# Patient Record
Sex: FEMALE | Race: WHITE | NOT HISPANIC OR LATINO
[De-identification: names, ages, dates, MRNs, and addresses within clinical notes are randomized per-mention and may not be internally consistent; named-entity substitution may affect disease eponyms.]

---

## 2017-09-27 ENCOUNTER — APPOINTMENT (OUTPATIENT)
Dept: MRI IMAGING | Facility: CLINIC | Age: 82
End: 2017-09-27
Payer: MEDICARE

## 2017-09-27 ENCOUNTER — OUTPATIENT (OUTPATIENT)
Dept: OUTPATIENT SERVICES | Facility: HOSPITAL | Age: 82
LOS: 1 days | End: 2017-09-27
Payer: MEDICARE

## 2017-09-27 DIAGNOSIS — Z00.8 ENCOUNTER FOR OTHER GENERAL EXAMINATION: ICD-10-CM

## 2017-09-27 PROCEDURE — 72148 MRI LUMBAR SPINE W/O DYE: CPT

## 2017-09-27 PROCEDURE — 72195 MRI PELVIS W/O DYE: CPT | Mod: 26

## 2017-09-27 PROCEDURE — 72195 MRI PELVIS W/O DYE: CPT

## 2017-09-27 PROCEDURE — 72148 MRI LUMBAR SPINE W/O DYE: CPT | Mod: 26

## 2020-02-12 ENCOUNTER — APPOINTMENT (OUTPATIENT)
Dept: OPHTHALMOLOGY | Facility: CLINIC | Age: 85
End: 2020-02-12
Payer: MEDICARE

## 2020-02-12 ENCOUNTER — NON-APPOINTMENT (OUTPATIENT)
Age: 85
End: 2020-02-12

## 2020-02-12 PROCEDURE — 92004 COMPRE OPH EXAM NEW PT 1/>: CPT

## 2020-02-12 PROCEDURE — 92083 EXTENDED VISUAL FIELD XM: CPT

## 2020-02-12 PROCEDURE — 92133 CPTRZD OPH DX IMG PST SGM ON: CPT

## 2020-02-12 PROCEDURE — 92015 DETERMINE REFRACTIVE STATE: CPT

## 2020-10-27 ENCOUNTER — TRANSCRIPTION ENCOUNTER (OUTPATIENT)
Age: 85
End: 2020-10-27

## 2021-02-08 ENCOUNTER — APPOINTMENT (OUTPATIENT)
Dept: MRI IMAGING | Facility: HOSPITAL | Age: 86
End: 2021-02-08

## 2021-02-08 ENCOUNTER — APPOINTMENT (OUTPATIENT)
Dept: ORTHOPEDIC SURGERY | Facility: CLINIC | Age: 86
End: 2021-02-08
Payer: MEDICARE

## 2021-02-08 DIAGNOSIS — M17.11 UNILATERAL PRIMARY OSTEOARTHRITIS, RIGHT KNEE: ICD-10-CM

## 2021-02-08 PROCEDURE — 73564 X-RAY EXAM KNEE 4 OR MORE: CPT | Mod: RT

## 2021-02-08 PROCEDURE — 99203 OFFICE O/P NEW LOW 30 MIN: CPT

## 2021-02-09 ENCOUNTER — APPOINTMENT (OUTPATIENT)
Dept: ORTHOPEDIC SURGERY | Facility: CLINIC | Age: 86
End: 2021-02-09

## 2021-02-09 DIAGNOSIS — M84.451D PATHOLOGICAL FRACTURE, RIGHT FEMUR, SUBSEQUENT ENCOUNTER FOR FRACTURE WITH ROUTINE HEALING: ICD-10-CM

## 2021-02-09 NOTE — HISTORY OF PRESENT ILLNESS
[de-identified] : AHMET JIMENEZ is a 89 year female presenting to the office complaining of right knee pain. She  presents to the office ambulating with a cane and assistance from her daughter . Patient reports pain for the past one week. Patients daughter reports worsening pain that is making it difficulty for her to walk.  Denies injury or trauma to the area.  The patient describes the pain as a dull aching, and occasionally sharp pain localized to the medial aspect of her right knee that is intermittent in nature. Her   symptoms are exacerbated with any weightbearing of the knee.  Pain is alleviated with rest.  Patient denies instability, catching or locking of the knee. \par Patient is taking Tylenol for pain relief with mild relief in symptoms.  Patient denies any other complaints at this time.\par

## 2021-02-09 NOTE — PHYSICAL EXAM
[de-identified] : Right Lower Extremity\par o Hip :\par ¦ Inspection/Palpation : no tenderness, no swelling, no deformities\par ¦ Range of Motion : full and painless in all planes, no crepitus\par ¦ Stability : joint stability intact\par ¦ Tests and Signs : all tests for stability normal\par \par o Knee :\par ¦ Inspection/Palpation : marked medial and lateral joint line tenderness to palpation, no swelling, no deformity\par ¦ Range of Motion : 0 - 90 degrees, no crepitus\par ¦ Stability : no valgus or varus instability present on provocative testing, Lachman’s Test (-)\par ¦ Strength : flexion and extension 3/5 with pain. \par o Muscle Bulk : normal muscle bulk present\par o Skin : no erythema, no ecchymosis\par o Sensation : sensation to pin intact\par o Vascular Exam : no edema, no cyanosis, dorsalis pedis artery pulse 2+, posterior tibial artery pulse 2+\par \par Left Lower Extremity\par o Knee :\par ¦ Inspection/Palpation : no tenderness to palpation, no swelling, no deformity\par ¦ Range of Motion : 0 -110 degrees, no crepitus\par ¦ Stability : no valgus or varus instability present on provocative testing, Lachman’s Test (-)\par ¦ Strength : flexion and extension 5/5\par o Muscle Bulk : normal muscle bulk present\par o Skin : no erythema, no ecchymosis\par o Sensation : sensation to pin intact\par o Vascular Exam : no edema, no cyanosis, dorsalis pedis artery pulse 2+, posterior tibial artery pulse 2+ [de-identified] : o Right Knee : AP, lateral, sunrise, and Jarrett views of the knee were obtained, there are no soft tissue abnormalities, no fractures, alignment is normal, 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

## 2021-02-09 NOTE — DISCUSSION/SUMMARY
[de-identified] : The underlying pathophysiology was reviewed in great detail with the patient as well as the various treatment options, including ice, analgesics, NSAIDs, Physical therapy, steroid injections, hyaluronic gel injections, TKR, \par \par A prescription was provided for a MRI of the right knee to rule out subchondral insufficiency fracture.  \par \par Activity modifications and restrictions were discussed. Discussed WBAT with assistive device. \par \par FU once MRI results are obtained. \par \par All questions were answered, all alternatives discussed and the patient is in complete agreement with that plan. Follow-up appointment as instructed. Any issues and the patient will call or come in sooner.

## 2021-02-10 PROBLEM — M84.451D SUBCHONDRAL INSUFFICIENCY FRACTURE OF CONDYLE OF RIGHT FEMUR WITH ROUTINE HEALING, SUBSEQUENT ENCOUNTER: Status: ACTIVE | Noted: 2021-02-10

## 2021-02-10 RX ORDER — CALCITONIN SALMON 200 [IU]/1
200 SPRAY, METERED NASAL DAILY
Qty: 1 | Refills: 1 | Status: ACTIVE | COMMUNITY
Start: 2021-02-10 | End: 1900-01-01

## 2021-05-17 ENCOUNTER — TRANSCRIPTION ENCOUNTER (OUTPATIENT)
Age: 86
End: 2021-05-17

## 2021-05-17 VITALS
RESPIRATION RATE: 18 BRPM | DIASTOLIC BLOOD PRESSURE: 89 MMHG | HEART RATE: 77 BPM | HEIGHT: 63 IN | SYSTOLIC BLOOD PRESSURE: 190 MMHG | TEMPERATURE: 98 F | WEIGHT: 104.94 LBS | OXYGEN SATURATION: 96 %

## 2021-05-17 LAB
ALBUMIN SERPL ELPH-MCNC: 4.5 G/DL — SIGNIFICANT CHANGE UP (ref 3.3–5)
ALP SERPL-CCNC: 124 U/L — HIGH (ref 40–120)
ALT FLD-CCNC: 17 U/L — SIGNIFICANT CHANGE UP (ref 10–45)
ANION GAP SERPL CALC-SCNC: 10 MMOL/L — SIGNIFICANT CHANGE UP (ref 5–17)
APPEARANCE UR: CLEAR — SIGNIFICANT CHANGE UP
AST SERPL-CCNC: 22 U/L — SIGNIFICANT CHANGE UP (ref 10–40)
BACTERIA # UR AUTO: PRESENT /HPF
BASOPHILS # BLD AUTO: 0.09 K/UL — SIGNIFICANT CHANGE UP (ref 0–0.2)
BASOPHILS NFR BLD AUTO: 0.5 % — SIGNIFICANT CHANGE UP (ref 0–2)
BILIRUB SERPL-MCNC: 0.4 MG/DL — SIGNIFICANT CHANGE UP (ref 0.2–1.2)
BILIRUB UR-MCNC: NEGATIVE — SIGNIFICANT CHANGE UP
BLD GP AB SCN SERPL QL: NEGATIVE — SIGNIFICANT CHANGE UP
BUN SERPL-MCNC: 17 MG/DL — SIGNIFICANT CHANGE UP (ref 7–23)
CALCIUM SERPL-MCNC: 9.9 MG/DL — SIGNIFICANT CHANGE UP (ref 8.4–10.5)
CHLORIDE SERPL-SCNC: 105 MMOL/L — SIGNIFICANT CHANGE UP (ref 96–108)
CO2 SERPL-SCNC: 29 MMOL/L — SIGNIFICANT CHANGE UP (ref 22–31)
COLOR SPEC: YELLOW — SIGNIFICANT CHANGE UP
COMMENT - URINE: SIGNIFICANT CHANGE UP
CREAT SERPL-MCNC: 0.7 MG/DL — SIGNIFICANT CHANGE UP (ref 0.5–1.3)
DIFF PNL FLD: ABNORMAL
EOSINOPHIL # BLD AUTO: 0.23 K/UL — SIGNIFICANT CHANGE UP (ref 0–0.5)
EOSINOPHIL NFR BLD AUTO: 1.3 % — SIGNIFICANT CHANGE UP (ref 0–6)
EPI CELLS # UR: ABNORMAL /HPF (ref 0–5)
GLUCOSE SERPL-MCNC: 112 MG/DL — HIGH (ref 70–99)
GLUCOSE UR QL: NEGATIVE — SIGNIFICANT CHANGE UP
HCT VFR BLD CALC: 45.8 % — HIGH (ref 34.5–45)
HGB BLD-MCNC: 14.7 G/DL — SIGNIFICANT CHANGE UP (ref 11.5–15.5)
IMM GRANULOCYTES NFR BLD AUTO: 0.7 % — SIGNIFICANT CHANGE UP (ref 0–1.5)
KETONES UR-MCNC: NEGATIVE — SIGNIFICANT CHANGE UP
LEUKOCYTE ESTERASE UR-ACNC: ABNORMAL
LYMPHOCYTES # BLD AUTO: 1.97 K/UL — SIGNIFICANT CHANGE UP (ref 1–3.3)
LYMPHOCYTES # BLD AUTO: 11.1 % — LOW (ref 13–44)
MAGNESIUM SERPL-MCNC: 2 MG/DL — SIGNIFICANT CHANGE UP (ref 1.6–2.6)
MCHC RBC-ENTMCNC: 30.2 PG — SIGNIFICANT CHANGE UP (ref 27–34)
MCHC RBC-ENTMCNC: 32.1 GM/DL — SIGNIFICANT CHANGE UP (ref 32–36)
MCV RBC AUTO: 94 FL — SIGNIFICANT CHANGE UP (ref 80–100)
MONOCYTES # BLD AUTO: 0.9 K/UL — SIGNIFICANT CHANGE UP (ref 0–0.9)
MONOCYTES NFR BLD AUTO: 5.1 % — SIGNIFICANT CHANGE UP (ref 2–14)
NEUTROPHILS # BLD AUTO: 14.48 K/UL — HIGH (ref 1.8–7.4)
NEUTROPHILS NFR BLD AUTO: 81.3 % — HIGH (ref 43–77)
NITRITE UR-MCNC: NEGATIVE — SIGNIFICANT CHANGE UP
NRBC # BLD: 0 /100 WBCS — SIGNIFICANT CHANGE UP (ref 0–0)
PH UR: 7 — SIGNIFICANT CHANGE UP (ref 5–8)
PLATELET # BLD AUTO: 383 K/UL — SIGNIFICANT CHANGE UP (ref 150–400)
POTASSIUM SERPL-MCNC: 5 MMOL/L — SIGNIFICANT CHANGE UP (ref 3.5–5.3)
POTASSIUM SERPL-SCNC: 5 MMOL/L — SIGNIFICANT CHANGE UP (ref 3.5–5.3)
PROT SERPL-MCNC: 7.2 G/DL — SIGNIFICANT CHANGE UP (ref 6–8.3)
PROT UR-MCNC: ABNORMAL MG/DL
RBC # BLD: 4.87 M/UL — SIGNIFICANT CHANGE UP (ref 3.8–5.2)
RBC # FLD: 13.4 % — SIGNIFICANT CHANGE UP (ref 10.3–14.5)
RBC CASTS # UR COMP ASSIST: < 5 /HPF — SIGNIFICANT CHANGE UP
RH IG SCN BLD-IMP: POSITIVE — SIGNIFICANT CHANGE UP
SODIUM SERPL-SCNC: 144 MMOL/L — SIGNIFICANT CHANGE UP (ref 135–145)
SP GR SPEC: 1.02 — SIGNIFICANT CHANGE UP (ref 1–1.03)
TROPONIN T SERPL-MCNC: 0.01 NG/ML — SIGNIFICANT CHANGE UP (ref 0–0.01)
UROBILINOGEN FLD QL: 0.2 E.U./DL — SIGNIFICANT CHANGE UP
WBC # BLD: 17.8 K/UL — HIGH (ref 3.8–10.5)
WBC # FLD AUTO: 17.8 K/UL — HIGH (ref 3.8–10.5)
WBC UR QL: > 10 /HPF

## 2021-05-17 PROCEDURE — 72125 CT NECK SPINE W/O DYE: CPT | Mod: 26,MH

## 2021-05-17 PROCEDURE — 99285 EMERGENCY DEPT VISIT HI MDM: CPT | Mod: CS

## 2021-05-17 PROCEDURE — 70450 CT HEAD/BRAIN W/O DYE: CPT | Mod: 26,MH

## 2021-05-17 PROCEDURE — 93010 ELECTROCARDIOGRAM REPORT: CPT

## 2021-05-17 RX ORDER — CEFTRIAXONE 500 MG/1
1000 INJECTION, POWDER, FOR SOLUTION INTRAMUSCULAR; INTRAVENOUS ONCE
Refills: 0 | Status: COMPLETED | OUTPATIENT
Start: 2021-05-17 | End: 2021-05-17

## 2021-05-17 RX ORDER — SODIUM CHLORIDE 9 MG/ML
1000 INJECTION INTRAMUSCULAR; INTRAVENOUS; SUBCUTANEOUS ONCE
Refills: 0 | Status: COMPLETED | OUTPATIENT
Start: 2021-05-17 | End: 2021-05-17

## 2021-05-17 RX ORDER — HALOPERIDOL DECANOATE 100 MG/ML
2.5 INJECTION INTRAMUSCULAR ONCE
Refills: 0 | Status: COMPLETED | OUTPATIENT
Start: 2021-05-17 | End: 2021-05-17

## 2021-05-17 RX ADMIN — SODIUM CHLORIDE 1000 MILLILITER(S): 9 INJECTION INTRAMUSCULAR; INTRAVENOUS; SUBCUTANEOUS at 22:06

## 2021-05-17 RX ADMIN — HALOPERIDOL DECANOATE 2.5 MILLIGRAM(S): 100 INJECTION INTRAMUSCULAR at 22:46

## 2021-05-17 RX ADMIN — CEFTRIAXONE 100 MILLIGRAM(S): 500 INJECTION, POWDER, FOR SOLUTION INTRAMUSCULAR; INTRAVENOUS at 22:06

## 2021-05-17 RX ADMIN — Medication 1 MILLIGRAM(S): at 22:06

## 2021-05-17 NOTE — ED ADULT NURSE REASSESSMENT NOTE - NS ED NURSE REASSESS COMMENT FT1
Patient noted restless in bed, unable to be redirected, unable to follow commands. Xray unable to be completed at this time due to pt's condition. MD Martinez made aware. Haldol given as ordered. Assessment ongoing.

## 2021-05-17 NOTE — ED PROVIDER NOTE - CLINICAL SUMMARY MEDICAL DECISION MAKING FREE TEXT BOX
avss. agitated/confused. per daughter, baseline dementia/confusion/agitation. unable to verbally deescalate. required haldol 5/ativan 1. no leukocytosis vs significant anemia vs electrolyte abnl. trop wnl. ekg w/o significant st/t changes. cxr w/o acute osseous fx vs focal consol vs ptx vs pulm edema vs widened mediastinum. ct head/c spine w/o acute abnl. found to have uti on ua. s/p abx/ivf. ucx pending. also found to have R hip fx on xray. preop labs/ekg done. ortho consulted. will admit per reccs.

## 2021-05-17 NOTE — ED ADULT NURSE NOTE - CHIEF COMPLAINT QUOTE
pt bibems s/p unwitnessed fall at [address] [place] [address] assisted living [facility]. pt a&o to self, unsure of loc and unsure of hitting head. no blood thinner use, per pt paperwork. external rotation noted to right leg.

## 2021-05-17 NOTE — ED ADULT NURSE NOTE - INTERVENTIONS DEFINITIONS
[place] to call system/Call bell, personal items and telephone within reach/Instruct patient to call for assistance/Room bathroom lighting operational/Non-slip footwear when patient is off stretcher/Physically safe environment: no spills, clutter or unnecessary equipment/Stretcher in lowest position, wheels locked, appropriate side rails in place/Provide visual cue, wrist band, yellow gown, etc./Monitor for mental status changes and reorient to person, place, and time/Review medications for side effects contributing to fall risk

## 2021-05-17 NOTE — ED PROVIDER NOTE - PROGRESS NOTE DETAILS
unable to verbally redirect pt. pt pulling at lines and attempting to pull self out of bed despite fall risk. already on standing ativan at [facility], will give ativan 1. requiring additional sedation. will give haldol 2.5. daughter/hcp, yovana london, contacted. updated and agrees w/ plan. ortho reccs for admission to medicine. per medicine hospitalist, pt to be admitted to ortho and medicine will consult. ortho recontacted. reportedly still to go to medicine. will speak w/ ortho attending directly. dr humphrey, ortho, contacted at [phone]. okay to admit to ortho. daughter/hcp, yovana london, contacted. reportedly baseline dementia/confusion/agitation. updated and agrees w/ plan.

## 2021-05-17 NOTE — ED PROVIDER NOTE - CARE PLAN
Principal Discharge DX:	Hip fracture, right  Secondary Diagnosis:	UTI (urinary tract infection)  Secondary Diagnosis:	AMS (altered mental status)   Principal Discharge DX:	Hip fracture, right  Secondary Diagnosis:	UTI (urinary tract infection)

## 2021-05-17 NOTE — ED PROVIDER NOTE - OBJECTIVE STATEMENT
89F progressive dementia, episodic psychosis, depression/anxiety, htn, hld, R knee fx, biba from [address] [place] [address] for R hip pain/unable to move RLE s/p unwitnessed fall pta. ?head/neck trauma. ?loc. no n/v. no antiplatelet/AC. pt unable to provide ros. 89F progressive dementia, episodic psychosis, depression/anxiety, htn, hld, R knee fx, biba from [address] [place] [address] for R hip pain/unable to move RLE s/p unwitnessed fall pta. ?head/neck trauma. ?loc. no n/v. no antiplatelet/AC. pt unable to provide ros.    daughter/hcp, yovana batres: [phone]

## 2021-05-17 NOTE — ED PROVIDER NOTE - PHYSICAL EXAMINATION
CONST: anxious confused uncooperative speaking in full sentences   HEAD: atraumatic  EYES: conjunctivae clear, PERRL, EOMI, no racoon eyes  ENT: mmm, no loose/avulsed dentition, tmj intact, no hernandez sign, no clear rhinorrhea, no hemotympanum  NECK: supple/FROM, no grimace to midline palpation, no jvd  CARD: rrr no murmurs, no chest wall ttp/crepitus/deformity  CHEST: ctab no r/r/w, no stridor/retractions/tripoding  ABD: soft, nd, nttp, no rebound/guarding  PELVIS: +ttp R hip, +externally rotated shorted RLE, +R hip pain w/ axial/log roll  BACK: no stepoffs, atraumatic, no grimace to midline palpation  EXT: compartments soft, FROM, symmetric distal pulses intact  SKIN: warm, dry, no rash, no pedal edema/ttp/rash, cap refill <2sec  NEURO: alert, confused, uncooperative, moves all 4 extremities spontaneously

## 2021-05-17 NOTE — ED ADULT TRIAGE NOTE - CHIEF COMPLAINT QUOTE
pt mike s/p unwitnessed fall at [address] assisted living [facility]. pt a&o to self, unsure of loc, hit head. external rotation noted to right leg. pt bibems s/p unwitnessed fall at [address] [place] [address] assisted living [facility]. pt a&o to self, unsure of loc and unsure of hitting head. no blood thinner use, per pt paperwork. external rotation noted to right leg.

## 2021-05-18 ENCOUNTER — TRANSCRIPTION ENCOUNTER (OUTPATIENT)
Age: 86
End: 2021-05-18

## 2021-05-18 ENCOUNTER — INPATIENT (INPATIENT)
Facility: HOSPITAL | Age: 86
LOS: 2 days | Discharge: NURSING, REHAB OR LONG-TERM CARE | DRG: 480 | End: 2021-05-21
Attending: ORTHOPAEDIC SURGERY | Admitting: HOSPITALIST
Payer: MEDICARE

## 2021-05-18 LAB
APTT BLD: 30.2 SEC — SIGNIFICANT CHANGE UP (ref 27.5–35.5)
BLD GP AB SCN SERPL QL: NEGATIVE — SIGNIFICANT CHANGE UP
GLUCOSE BLDC GLUCOMTR-MCNC: 114 MG/DL — HIGH (ref 70–99)
INR BLD: 1.04 — SIGNIFICANT CHANGE UP (ref 0.88–1.16)
PROTHROM AB SERPL-ACNC: 12.4 SEC — SIGNIFICANT CHANGE UP (ref 10.6–13.6)
RH IG SCN BLD-IMP: POSITIVE — SIGNIFICANT CHANGE UP
SARS-COV-2 RNA SPEC QL NAA+PROBE: SIGNIFICANT CHANGE UP

## 2021-05-18 PROCEDURE — 71045 X-RAY EXAM CHEST 1 VIEW: CPT | Mod: 26

## 2021-05-18 PROCEDURE — 73502 X-RAY EXAM HIP UNI 2-3 VIEWS: CPT | Mod: 26,RT

## 2021-05-18 PROCEDURE — 99222 1ST HOSP IP/OBS MODERATE 55: CPT

## 2021-05-18 PROCEDURE — 73552 X-RAY EXAM OF FEMUR 2/>: CPT | Mod: 26,RT

## 2021-05-18 RX ORDER — SODIUM CHLORIDE 9 MG/ML
1000 INJECTION INTRAMUSCULAR; INTRAVENOUS; SUBCUTANEOUS
Refills: 0 | Status: DISCONTINUED | OUTPATIENT
Start: 2021-05-18 | End: 2021-05-18

## 2021-05-18 RX ORDER — TRAMADOL HYDROCHLORIDE 50 MG/1
50 TABLET ORAL EVERY 4 HOURS
Refills: 0 | Status: DISCONTINUED | OUTPATIENT
Start: 2021-05-18 | End: 2021-05-18

## 2021-05-18 RX ORDER — ACETAMINOPHEN 500 MG
975 TABLET ORAL EVERY 8 HOURS
Refills: 0 | Status: DISCONTINUED | OUTPATIENT
Start: 2021-05-18 | End: 2021-05-18

## 2021-05-18 RX ORDER — CEFAZOLIN SODIUM 1 G
2000 VIAL (EA) INJECTION EVERY 8 HOURS
Refills: 0 | Status: DISCONTINUED | OUTPATIENT
Start: 2021-05-18 | End: 2021-05-18

## 2021-05-18 RX ORDER — HYDROMORPHONE HYDROCHLORIDE 2 MG/ML
0.5 INJECTION INTRAMUSCULAR; INTRAVENOUS; SUBCUTANEOUS
Refills: 0 | Status: DISCONTINUED | OUTPATIENT
Start: 2021-05-18 | End: 2021-05-19

## 2021-05-18 RX ORDER — CEFTRIAXONE 500 MG/1
1000 INJECTION, POWDER, FOR SOLUTION INTRAMUSCULAR; INTRAVENOUS EVERY 24 HOURS
Refills: 0 | Status: DISCONTINUED | OUTPATIENT
Start: 2021-05-18 | End: 2021-05-18

## 2021-05-18 RX ORDER — CEFAZOLIN SODIUM 1 G
2000 VIAL (EA) INJECTION EVERY 8 HOURS
Refills: 0 | Status: COMPLETED | OUTPATIENT
Start: 2021-05-18 | End: 2021-05-19

## 2021-05-18 RX ORDER — SENNA PLUS 8.6 MG/1
2 TABLET ORAL AT BEDTIME
Refills: 0 | Status: DISCONTINUED | OUTPATIENT
Start: 2021-05-18 | End: 2021-05-18

## 2021-05-18 RX ORDER — CHLORHEXIDINE GLUCONATE 213 G/1000ML
1 SOLUTION TOPICAL EVERY 12 HOURS
Refills: 0 | Status: DISCONTINUED | OUTPATIENT
Start: 2021-05-18 | End: 2021-05-18

## 2021-05-18 RX ORDER — SIMVASTATIN 20 MG/1
20 TABLET, FILM COATED ORAL AT BEDTIME
Refills: 0 | Status: DISCONTINUED | OUTPATIENT
Start: 2021-05-18 | End: 2021-05-18

## 2021-05-18 RX ORDER — FOLIC ACID 0.8 MG
1 TABLET ORAL DAILY
Refills: 0 | Status: DISCONTINUED | OUTPATIENT
Start: 2021-05-18 | End: 2021-05-21

## 2021-05-18 RX ORDER — ASPIRIN/CALCIUM CARB/MAGNESIUM 324 MG
325 TABLET ORAL
Refills: 0 | Status: DISCONTINUED | OUTPATIENT
Start: 2021-05-19 | End: 2021-05-21

## 2021-05-18 RX ORDER — ALPRAZOLAM 0.25 MG
0.25 TABLET ORAL
Refills: 0 | Status: DISCONTINUED | OUTPATIENT
Start: 2021-05-18 | End: 2021-05-18

## 2021-05-18 RX ORDER — ONDANSETRON 8 MG/1
4 TABLET, FILM COATED ORAL EVERY 4 HOURS
Refills: 0 | Status: DISCONTINUED | OUTPATIENT
Start: 2021-05-18 | End: 2021-05-21

## 2021-05-18 RX ORDER — HYDRALAZINE HCL 50 MG
5 TABLET ORAL ONCE
Refills: 0 | Status: DISCONTINUED | OUTPATIENT
Start: 2021-05-18 | End: 2021-05-18

## 2021-05-18 RX ORDER — ESCITALOPRAM OXALATE 10 MG/1
20 TABLET, FILM COATED ORAL DAILY
Refills: 0 | Status: DISCONTINUED | OUTPATIENT
Start: 2021-05-18 | End: 2021-05-21

## 2021-05-18 RX ORDER — TRAMADOL HYDROCHLORIDE 50 MG/1
50 TABLET ORAL EVERY 4 HOURS
Refills: 0 | Status: DISCONTINUED | OUTPATIENT
Start: 2021-05-18 | End: 2021-05-21

## 2021-05-18 RX ORDER — PANTOPRAZOLE SODIUM 20 MG/1
40 TABLET, DELAYED RELEASE ORAL
Refills: 0 | Status: DISCONTINUED | OUTPATIENT
Start: 2021-05-18 | End: 2021-05-21

## 2021-05-18 RX ORDER — OXYCODONE HYDROCHLORIDE 5 MG/1
10 TABLET ORAL EVERY 4 HOURS
Refills: 0 | Status: DISCONTINUED | OUTPATIENT
Start: 2021-05-18 | End: 2021-05-18

## 2021-05-18 RX ORDER — POVIDONE-IODINE 5 %
1 AEROSOL (ML) TOPICAL ONCE
Refills: 0 | Status: COMPLETED | OUTPATIENT
Start: 2021-05-18 | End: 2021-05-18

## 2021-05-18 RX ORDER — TRAMADOL HYDROCHLORIDE 50 MG/1
25 TABLET ORAL EVERY 4 HOURS
Refills: 0 | Status: DISCONTINUED | OUTPATIENT
Start: 2021-05-18 | End: 2021-05-21

## 2021-05-18 RX ORDER — SODIUM CHLORIDE 9 MG/ML
1000 INJECTION, SOLUTION INTRAVENOUS
Refills: 0 | Status: DISCONTINUED | OUTPATIENT
Start: 2021-05-18 | End: 2021-05-21

## 2021-05-18 RX ORDER — TRAMADOL HYDROCHLORIDE 50 MG/1
25 TABLET ORAL EVERY 4 HOURS
Refills: 0 | Status: DISCONTINUED | OUTPATIENT
Start: 2021-05-18 | End: 2021-05-18

## 2021-05-18 RX ORDER — ALPRAZOLAM 0.25 MG
0.25 TABLET ORAL
Refills: 0 | Status: DISCONTINUED | OUTPATIENT
Start: 2021-05-18 | End: 2021-05-20

## 2021-05-18 RX ORDER — MAGNESIUM HYDROXIDE 400 MG/1
30 TABLET, CHEWABLE ORAL DAILY
Refills: 0 | Status: DISCONTINUED | OUTPATIENT
Start: 2021-05-18 | End: 2021-05-21

## 2021-05-18 RX ORDER — FAMOTIDINE 10 MG/ML
40 INJECTION INTRAVENOUS EVERY 12 HOURS
Refills: 0 | Status: DISCONTINUED | OUTPATIENT
Start: 2021-05-18 | End: 2021-05-18

## 2021-05-18 RX ORDER — ESCITALOPRAM OXALATE 10 MG/1
20 TABLET, FILM COATED ORAL DAILY
Refills: 0 | Status: DISCONTINUED | OUTPATIENT
Start: 2021-05-18 | End: 2021-05-18

## 2021-05-18 RX ORDER — SENNA PLUS 8.6 MG/1
2 TABLET ORAL AT BEDTIME
Refills: 0 | Status: DISCONTINUED | OUTPATIENT
Start: 2021-05-18 | End: 2021-05-21

## 2021-05-18 RX ORDER — OXYCODONE HYDROCHLORIDE 5 MG/1
5 TABLET ORAL EVERY 4 HOURS
Refills: 0 | Status: DISCONTINUED | OUTPATIENT
Start: 2021-05-18 | End: 2021-05-18

## 2021-05-18 RX ORDER — SIMVASTATIN 20 MG/1
20 TABLET, FILM COATED ORAL AT BEDTIME
Refills: 0 | Status: DISCONTINUED | OUTPATIENT
Start: 2021-05-18 | End: 2021-05-21

## 2021-05-18 RX ORDER — ONDANSETRON 8 MG/1
4 TABLET, FILM COATED ORAL EVERY 6 HOURS
Refills: 0 | Status: DISCONTINUED | OUTPATIENT
Start: 2021-05-18 | End: 2021-05-18

## 2021-05-18 RX ORDER — ACETAMINOPHEN 500 MG
975 TABLET ORAL EVERY 8 HOURS
Refills: 0 | Status: DISCONTINUED | OUTPATIENT
Start: 2021-05-18 | End: 2021-05-21

## 2021-05-18 RX ADMIN — Medication 0.25 MILLIGRAM(S): at 21:11

## 2021-05-18 RX ADMIN — Medication 975 MILLIGRAM(S): at 21:10

## 2021-05-18 RX ADMIN — SODIUM CHLORIDE 60 MILLILITER(S): 9 INJECTION INTRAMUSCULAR; INTRAVENOUS; SUBCUTANEOUS at 09:22

## 2021-05-18 RX ADMIN — SENNA PLUS 2 TABLET(S): 8.6 TABLET ORAL at 21:11

## 2021-05-18 RX ADMIN — SODIUM CHLORIDE 100 MILLILITER(S): 9 INJECTION, SOLUTION INTRAVENOUS at 17:17

## 2021-05-18 RX ADMIN — ESCITALOPRAM OXALATE 20 MILLIGRAM(S): 10 TABLET, FILM COATED ORAL at 17:16

## 2021-05-18 RX ADMIN — Medication 1 APPLICATION(S): at 09:28

## 2021-05-18 RX ADMIN — Medication 975 MILLIGRAM(S): at 22:00

## 2021-05-18 RX ADMIN — SIMVASTATIN 20 MILLIGRAM(S): 20 TABLET, FILM COATED ORAL at 21:10

## 2021-05-18 RX ADMIN — CHLORHEXIDINE GLUCONATE 1 APPLICATION(S): 213 SOLUTION TOPICAL at 06:52

## 2021-05-18 RX ADMIN — Medication 2000 MILLIGRAM(S): at 20:00

## 2021-05-18 NOTE — PACU DISCHARGE NOTE - COMMENTS
pt met criteria at baseline- s/p right femur intertrochanteric nail of the hip- 4 surgical steri strips x4 covered by gauze and Tegaderm with minimal serosanguinous output- severely demented at baseline - frequent orientation provided at risk for fall -pt in need of one-to one- JERRY Hernandez notified -Daughter of pt stated that mother needs additional help due to fall risk- pt agitated - sinus tachycardia to NSR- MD Monzon notified - no intervention at present- denies Pain at present- 2+ bilateral DP via doppler-skin warm to touch - + capillary refill WNL- passed TOV- urinary incontinent- keep pt clean and safe at all times- T&P Q2 hours - generalized ecchymosis with skin tear to left forearm wrapped arrival to PACU- endorsed to 9 ezequiel nurse- Pt left unit via selam on IVF and supplemental oxygen to 951

## 2021-05-18 NOTE — H&P ADULT - HISTORY OF PRESENT ILLNESS
89F hx dementia, HTN and HLD BIBEMS s/p unwitnessed fall with R hip pain. Felt immediate pain and unable to bear weight. No head trauma or LOC. No other injuries. Per daughter, Ayana, patient was dropped off at an assisted living facility today. Ambulates with cane at baseline.

## 2021-05-18 NOTE — PRE-OP CHECKLIST - AS BP NONINV SITE
Occupational Therapy Discharge Summary    Reason for therapy discharge:    All goals and outcomes met, no further needs identified.    Progress towards therapy goal(s). See goals on Care Plan in [facility] electronic health record for goal details.  Goals met    Therapy recommendation(s):    Continue home exercise program.       right upper arm

## 2021-05-18 NOTE — H&P ADULT - NSHPPHYSICALEXAM_GEN_ALL_CORE
Gen: Sleepy but arousable   R hip tender along groin   Sensation intact distally   EHL/FHL/TA/GS firing   2+ DP pulse  Toes WWP

## 2021-05-18 NOTE — BRIEF OPERATIVE NOTE - NSICDXBRIEFPREOP_GEN_ALL_CORE_FT
PRE-OP DIAGNOSIS:  Fracture of femur, intertrochanteric, right, closed 18-May-2021 14:35:11  Alex Hale

## 2021-05-18 NOTE — DIETITIAN INITIAL EVALUATION ADULT. - ADD RECOMMEND
1. Once diet advances, rec DASH/TLC diet 2. Monitor %PO intake and need for ONS 3. pain and BM per team 3. Monitor labs: BMP, lytes, replete PRN

## 2021-05-18 NOTE — CONSULT NOTE ADULT - SUBJECTIVE AND OBJECTIVE BOX
Orthopaedic Surgery Consult Note    For Surgeon:    HPI:  DaciayFemale  Patient is a 89y old  Female who presents with a chief complaint of   HPI:      Allergies    No Known Allergies    Intolerances      PAST MEDICAL & SURGICAL HISTORY:  Dementia    HTN (hypertension)    Anxiety    Depression      MEDICATIONS  (STANDING):    MEDICATIONS  (PRN):      Vital Signs Last 24 Hrs  T(C): 37.3 (17 May 2021 22:08), Max: 37.3 (17 May 2021 22:08)  T(F): 99.2 (17 May 2021 22:08), Max: 99.2 (17 May 2021 22:08)  HR: 77 (17 May 2021 20:44) (77 - 77)  BP: 190/89 (17 May 2021 20:44) (190/89 - 190/89)  BP(mean): --  RR: 18 (17 May 2021 20:44) (18 - 18)  SpO2: 96% (17 May 2021 20:44) (96% - 96%)    Physical Exam:                          14.7   17.80 )-----------( 383      ( 17 May 2021 21:55 )             45.8     05-17    144  |  105  |  17  ----------------------------<  112<H>  5.0   |  29  |  0.70    Ca    9.9      17 May 2021 21:55  Mg     2.0     05-17    TPro  7.2  /  Alb  4.5  /  TBili  0.4  /  DBili  x   /  AST  22  /  ALT  17  /  AlkPhos  124<H>  05-17      Imaging:     A/P: austinFemale    -Discussed with         Orthopaedic Surgery Consult Note    For Surgeon: Ginette    HPI:  89F hx dementia, HTN and HLD BIBEMS s/p unwitnessed fall with R hip pain. Felt immediate pain and unable to bear weight. No head trauma or LOC. No other injuries. Per daughter, Ayana, patient was dropped off at an assisted living facility today. Ambulates with cane at baseline.      Allergies    No Known Allergies    Intolerances      PAST MEDICAL & SURGICAL HISTORY:  Dementia    HTN (hypertension)    Anxiety    Depression      MEDICATIONS  (STANDING):    MEDICATIONS  (PRN):      Vital Signs Last 24 Hrs  T(C): 37.3 (17 May 2021 22:08), Max: 37.3 (17 May 2021 22:08)  T(F): 99.2 (17 May 2021 22:08), Max: 99.2 (17 May 2021 22:08)  HR: 77 (17 May 2021 20:44) (77 - 77)  BP: 190/89 (17 May 2021 20:44) (190/89 - 190/89)  BP(mean): --  RR: 18 (17 May 2021 20:44) (18 - 18)  SpO2: 96% (17 May 2021 20:44) (96% - 96%)    Physical Exam:                          14.7   17.80 )-----------( 383      ( 17 May 2021 21:55 )             45.8     05-17    144  |  105  |  17  ----------------------------<  112<H>  5.0   |  29  |  0.70    Ca    9.9      17 May 2021 21:55  Mg     2.0     05-17    TPro  7.2  /  Alb  4.5  /  TBili  0.4  /  DBili  x   /  AST  22  /  ALT  17  /  AlkPhos  124<H>  05-17      Imaging:   X-ray Hip shows R IT fx    A/P: 89yFemale s/p mechanical fall with R IT fx    - Pain control   - Preop labs  - Xrays full length R femur   - Medical risk stratification and optimization   - NPO/IVF  - Restart home meds  - DVT ppx  - Plan: R hip intramedullary nail placement pending medical clearance       -Discussed with Dr. Peng

## 2021-05-18 NOTE — CONSULT NOTE ADULT - ASSESSMENT
89 year old F w/ PMH of dementia, HTN p/w R IT fracture after mechanical fall.     #Pre-op: RCRI class II risk given q waves on EKG, no active chest pain, no signs of decompensated heart failure, otherwise no hx of CHF, CVA, CKD, IDDM; given urgency of procedure can proceed to surgery, no further workup needed, post-op will need cardiac evaluation (outpatient vs inpatient) for likely underlying CAD  #HTN: likely 2/2 pain from fracture, c/w pain control  #Leukocytosis: likely reactive to fall, trend  #UTI: f/u urine culture, c/w ceftriaxone for 3 day course,   #R hip fracture: care per ortho, c/w pain control, avoid opiates  #Dispo: pending OR

## 2021-05-18 NOTE — DIETITIAN INITIAL EVALUATION ADULT. - PHYSCIAL ASSESSMENT
Pharmacy Medication History  Admission medication history interview status for the 5/24/2020  admission is complete. See EPIC admission navigator for prior to admission medications     Medication history sources: Patient  Medication history source reliability: Good  Adherence assessment: Good    Significant changes made to the medication list:  Removed: Albuterol inhaler, Lexapro, Ativan, Compazine      Additional medication history information:   None    Medication reconciliation completed by provider prior to medication history? No    Time spent in this activity: 15 min      Prior to Admission medications    Medication Sig Last Dose Taking? Auth Provider   acyclovir (ZOVIRAX) 400 MG tablet Take 1 tablet (400 mg) by mouth 2 times daily Viral Prophylaxis. 5/24/2020 at Unknown time Yes Alex Parry MD   aspirin 81 MG chewable tablet Take 81 mg by mouth daily  5/24/2020 at Unknown time Yes Reported, Patient   dexamethasone (DECADRON) 4 MG tablet Take 1 tablet (4 mg) by mouth once a week Once a week with food on Days 1,8,15.  Patient taking differently: Take 4 mg by mouth once a week On Mondays  Once a week with food on Days 1,8,15. 5/18/2020 Yes Alex Parry MD   LENalidomide (REVLIMID) 10 MG CAPS capsule Take 1 capsule (10 mg) by mouth daily for 14 days Take on Days 1 through 14 of 21-day cycle. 5/23/2020 Yes Alex Parry MD   lisinopril (ZESTRIL) 10 MG tablet TAKE 1 TABLET BY MOUTH DAILY 5/24/2020 at Unknown time Yes César Chung MD   Loperamide HCl (IMODIUM A-D PO) Take 1 tablet by mouth as needed   at PRN Yes Reported, Patient   MYRBETRIQ 25 MG 24 hr tablet TAKE ONE TABLET BY MOUTH ONCE DAILY 5/24/2020 at Unknown time Yes César Chung MD   nitroglycerin (NITROSTAT) 0.4 MG sublingual tablet For chest pain place 1 tablet under the tongue every 5 minutes for 3 doses. If symptoms persist 5 minutes after 1st dose call 911.  Yes Jacqueline Webb, MARIUSZ CNP   prochlorperazine (COMPAZINE) 10 MG  tablet Take 1 tablet (10 mg) by mouth every 6 hours as needed (Nausea/Vomiting)  Yes Alex Parry MD   Zoledronic Acid (ZOMETA IV) Inject 4 mg into the vein every 3 months  4/24/2020 Yes Reported, Patient   LENalidomide (REVLIMID) 10 MG CAPS capsule Take 1 capsule (10 mg) by mouth daily for 14 days Take on Days 1 through 14 of 21-day cycle.   Alex Parry MD        Pt with BMI of 19.0, borderline/at risk for underweight well nourished

## 2021-05-18 NOTE — CONSULT NOTE ADULT - SUBJECTIVE AND OBJECTIVE BOX
Patient is a 89y old  Female who presents with a chief complaint of R IT fx (18 May 2021 07:07)      INTERVAL HPI/OVERNIGHT EVENTS: 89 year old F w/ PMH of dementia, HTN, depression/anxiety, vit D deficiency presenting from nursing home after mechanical fall, found to have R IT fracture. History limited by dementia and additional history obtained from daughter by ortho HS. Denies hx of CAD, CHF, CKD, CVA, IDDM. Hx of HTN and off meds.   Patient was seen and examined at bedside. As per nurse and patient, no o/n events, patient resting mostly comfortably but in pain when moved. No complaints at this time. Patient denies: fever, chills, dizziness, weakness, HA, Changes in vision, CP, palpitations, SOB, cough, N/V/D/C, dysuria, changes in bowel movements, LE edema.      PAST MEDICAL & SURGICAL HISTORY:  Dementia    HTN (hypertension)    Anxiety    Depression        SOCIAL HISTORY  Alcohol: unable to obtain  Tobacco: unable to obtain  Illicit substance use: unable to obtain      FAMILY HISTORY: unable to obtain    REVIEW OF SYSTEMS:  CONSTITUTIONAL: No fever, weight loss, or fatigue  EYES: No eye pain, visual disturbances, or discharge  ENMT:  No difficulty hearing, tinnitus, vertigo; No sinus or throat pain  NECK: No pain or stiffness  RESPIRATORY: No cough, wheezing, chills or hemoptysis; No shortness of breath  CARDIOVASCULAR: No chest pain, palpitations, dizziness, or leg swelling  GASTROINTESTINAL: No abdominal or epigastric pain. No nausea, vomiting, or hematemesis; No diarrhea or constipation. No melena or hematochezia.  GENITOURINARY: No dysuria, frequency, hematuria, or incontinence  NEUROLOGICAL: No headaches, memory loss, loss of strength, numbness, or tremors  SKIN: No itching, burning, rashes, or lesions   LYMPH NODES: No enlarged glands  ENDOCRINE: No heat or cold intolerance; No hair loss  MUSCULOSKELETAL: No joint pain or swelling; No muscle, back, or extremity pain  PSYCHIATRIC: No depression, anxiety, mood swings, or difficulty sleeping  HEME/LYMPH: No easy bruising, or bleeding gums  ALLERY AND IMMUNOLOGIC: No hives or eczema    T(C): 36.4 (21 @ 08:46), Max: 37.3 (21 @ 22:08)  HR: 91 (21 @ 08:46) (77 - 91)  BP: 186/87 (21 @ 08:46) (149/99 - 190/89)  RR: 18 (21 @ 08:46) (18 - 19)  SpO2: 94% (21 @ 08:46) (93% - 96%)  Wt(kg): --  I&O's Summary      PHYSICAL EXAM:  GENERAL: frail, elderly,   HEAD:  Atraumatic, Normocephalic  EYES: EOMI, PERRLA,   ENMT: No tonsillar erythema, exudates, or enlargement; MMM  NECK: Supple, No JVD  NERVOUS SYSTEM:  Alert & Oriented X3, no focal deficits   CHEST/LUNG: Clear to percussion bilaterally; No rales, rhonchi, wheezing, or rubs  HEART: Regular rate and rhythm; No murmurs, rubs, or gallops  ABDOMEN: Soft, Nontender, Nondistended;   EXTREMITIES:  R leg externally rotated, skin abrasions on both legs        LABS:                        14.7   17.80 )-----------( 383      ( 17 May 2021 21:55 )             45.8         144  |  105  |  17  ----------------------------<  112<H>  5.0   |  29  |  0.70    Ca    9.9      17 May 2021 21:55  Mg     2.0         TPro  7.2  /  Alb  4.5  /  TBili  0.4  /  DBili  x   /  AST  22  /  ALT  17  /  AlkPhos  124<H>      PT/INR - ( 18 May 2021 00:40 )   PT: 12.4 sec;   INR: 1.04          PTT - ( 18 May 2021 00:40 )  PTT:30.2 sec  Urinalysis Basic - ( 17 May 2021 21:13 )    Color: Yellow / Appearance: Clear / S.025 / pH: x  Gluc: x / Ketone: NEGATIVE  / Bili: Negative / Urobili: 0.2 E.U./dL   Blood: x / Protein: Trace mg/dL / Nitrite: NEGATIVE   Leuk Esterase: Small / RBC: < 5 /HPF / WBC > 10 /HPF   Sq Epi: x / Non Sq Epi: Moderate /HPF / Bacteria: Present /HPF      CAPILLARY BLOOD GLUCOSE      POCT Blood Glucose.: 114 mg/dL (18 May 2021 09:01)        Urinalysis Basic - ( 17 May 2021 21:13 )    Color: Yellow / Appearance: Clear / S.025 / pH: x  Gluc: x / Ketone: NEGATIVE  / Bili: Negative / Urobili: 0.2 E.U./dL   Blood: x / Protein: Trace mg/dL / Nitrite: NEGATIVE   Leuk Esterase: Small / RBC: < 5 /HPF / WBC > 10 /HPF   Sq Epi: x / Non Sq Epi: Moderate /HPF / Bacteria: Present /HPF        MEDICATIONS  (STANDING):  cefTRIAXone   IVPB 1000 milliGRAM(s) IV Intermittent every 24 hours  chlorhexidine 2% Cloths 1 Application(s) Topical every 12 hours  multivitamin 1 Tablet(s) Oral daily  povidone iodine 5% Nasal Swab 1 Application(s) Both Nostrils once  senna 2 Tablet(s) Oral at bedtime  sodium chloride 0.9%. 1000 milliLiter(s) (60 mL/Hr) IV Continuous <Continuous>    MEDICATIONS  (PRN):  acetaminophen   Tablet .. 975 milliGRAM(s) Oral every 8 hours PRN Temp greater or equal to 38C (100.4F), Mild Pain (1 - 3)  ondansetron Injectable 4 milliGRAM(s) IV Push every 6 hours PRN Nausea and/or Vomiting  traMADol 25 milliGRAM(s) Oral every 4 hours PRN Moderate Pain (4 - 6)  traMADol 50 milliGRAM(s) Oral every 4 hours PRN Severe Pain (7 - 10)      RADIOLOGY & ADDITIONAL TESTS:    Imaging Personally Reviewed:  [ ] YES  [ ] NO    Consultant(s) Notes Reviewed:  [ ] YES  [ ] NO    Care Discussed with Consultants/Other Providers [ ] YES  [ ] NO

## 2021-05-18 NOTE — H&P ADULT - ASSESSMENT
89F s/p fall with R IT fx    - Admit  - Medical optimization and risk stratification   - Pain control   - NWB RLE  - DVT ppx  - Restart home meds  - Added on  - Dispo: plan for R IMN today pending medical clearance

## 2021-05-18 NOTE — PROGRESS NOTE ADULT - SUBJECTIVE AND OBJECTIVE BOX
Ortho Post Op Check    Procedure: R gamma nail  Surgeon: Dr. Peng     pt alert however not oriented.   unsure of location or situation   denies pain.   Denies CP, SOB, N/V, numbness/tingling     Vital Signs Last 24 Hrs  T(C): 36.8 (05-18-21 @ 21:45), Max: 36.8 (05-18-21 @ 21:45)  T(F): 98.3 (05-18-21 @ 21:45), Max: 98.3 (05-18-21 @ 21:45)  HR: 96 (05-18-21 @ 21:45) (96 - 106)  BP: 129/79 (05-18-21 @ 21:45) (129/71 - 159/70)  BP(mean): 92 (05-18-21 @ 21:37) (92 - 100)  RR: 19 (05-18-21 @ 21:45) (18 - 26)  SpO2: 94% (05-18-21 @ 21:45) (94% - 95%)      General: Pt Alert and oriented, NAD  DSG w mild serosang output   Pulses: palpable DP  Sensation: SILT throughout   Motor: 5/5 EHL/FHL/TA/GS        Post:   hardware in place    A/P: 89yFemale POD#0 s/p above procedure  - Stable  - Pain Control   - DVT ppx: asa  - Post op abx: ancef  - PT, WBS: WBAT    Ortho Pager [phone]

## 2021-05-18 NOTE — BRIEF OPERATIVE NOTE - NSICDXBRIEFPOSTOP_GEN_ALL_CORE_FT
POST-OP DIAGNOSIS:  Fracture of femur, intertrochanteric, right, closed 18-May-2021 14:35:16  Alex Hale

## 2021-05-18 NOTE — DIETITIAN INITIAL EVALUATION ADULT. - OTHER INFO
89F hx dementia, HTN and HLD, BIBEMS s/p unwitnessed fall with R hip pain. Plan for R IMN.    Pt currently in OR. Spoke to daughter, reported pt to have no issues with eating, obtained diet hx PTA. Reports a variety of foods that pt normally eats, where pt snacks throughout the day. Reports no chewing or swallowing difficulties, NKFA. UBW to be 90 lbs, no recent wt changes. Unable to perform NFPE, deferred at this time. GI: fecal incontinence. Skin integrity: Galileo 11, no edema noted, pressure wise skin intact. Will continue to follow per RD protocol.

## 2021-05-18 NOTE — DIETITIAN INITIAL EVALUATION ADULT. - OTHER CALCULATIONS
ABW used to calculate energy needs due to pt's current body weight within % IBW (91%). Estimated based on needs for older adults, and hypermetabolic state 2/2 s/p surgery

## 2021-05-19 LAB
-  AMPICILLIN/SULBACTAM: SIGNIFICANT CHANGE UP
-  AMPICILLIN: SIGNIFICANT CHANGE UP
-  CEFAZOLIN: SIGNIFICANT CHANGE UP
-  CEFTRIAXONE: SIGNIFICANT CHANGE UP
-  ERTAPENEM: SIGNIFICANT CHANGE UP
-  GENTAMICIN: SIGNIFICANT CHANGE UP
-  NITROFURANTOIN: SIGNIFICANT CHANGE UP
-  PIPERACILLIN/TAZOBACTAM: SIGNIFICANT CHANGE UP
-  TOBRAMYCIN: SIGNIFICANT CHANGE UP
-  TRIMETHOPRIM/SULFAMETHOXAZOLE: SIGNIFICANT CHANGE UP
ANION GAP SERPL CALC-SCNC: 12 MMOL/L — SIGNIFICANT CHANGE UP (ref 5–17)
BUN SERPL-MCNC: 15 MG/DL — SIGNIFICANT CHANGE UP (ref 7–23)
CALCIUM SERPL-MCNC: 9.2 MG/DL — SIGNIFICANT CHANGE UP (ref 8.4–10.5)
CHLORIDE SERPL-SCNC: 103 MMOL/L — SIGNIFICANT CHANGE UP (ref 96–108)
CO2 SERPL-SCNC: 25 MMOL/L — SIGNIFICANT CHANGE UP (ref 22–31)
CREAT SERPL-MCNC: 0.58 MG/DL — SIGNIFICANT CHANGE UP (ref 0.5–1.3)
GLUCOSE SERPL-MCNC: 115 MG/DL — HIGH (ref 70–99)
HCT VFR BLD CALC: 32.1 % — LOW (ref 34.5–45)
HGB BLD-MCNC: 10.5 G/DL — LOW (ref 11.5–15.5)
MCHC RBC-ENTMCNC: 29.7 PG — SIGNIFICANT CHANGE UP (ref 27–34)
MCHC RBC-ENTMCNC: 32.7 GM/DL — SIGNIFICANT CHANGE UP (ref 32–36)
MCV RBC AUTO: 90.7 FL — SIGNIFICANT CHANGE UP (ref 80–100)
METHOD TYPE: SIGNIFICANT CHANGE UP
NRBC # BLD: 0 /100 WBCS — SIGNIFICANT CHANGE UP (ref 0–0)
PLATELET # BLD AUTO: 346 K/UL — SIGNIFICANT CHANGE UP (ref 150–400)
POTASSIUM SERPL-MCNC: 3.6 MMOL/L — SIGNIFICANT CHANGE UP (ref 3.5–5.3)
POTASSIUM SERPL-SCNC: 3.6 MMOL/L — SIGNIFICANT CHANGE UP (ref 3.5–5.3)
RBC # BLD: 3.54 M/UL — LOW (ref 3.8–5.2)
RBC # FLD: 13.4 % — SIGNIFICANT CHANGE UP (ref 10.3–14.5)
SODIUM SERPL-SCNC: 140 MMOL/L — SIGNIFICANT CHANGE UP (ref 135–145)
WBC # BLD: 19.12 K/UL — HIGH (ref 3.8–10.5)
WBC # FLD AUTO: 19.12 K/UL — HIGH (ref 3.8–10.5)

## 2021-05-19 PROCEDURE — 99232 SBSQ HOSP IP/OBS MODERATE 35: CPT

## 2021-05-19 RX ORDER — CEFPODOXIME PROXETIL 100 MG
100 TABLET ORAL EVERY 12 HOURS
Refills: 0 | Status: DISCONTINUED | OUTPATIENT
Start: 2021-05-19 | End: 2021-05-19

## 2021-05-19 RX ORDER — CEFTRIAXONE 500 MG/1
1000 INJECTION, POWDER, FOR SOLUTION INTRAMUSCULAR; INTRAVENOUS ONCE
Refills: 0 | Status: COMPLETED | OUTPATIENT
Start: 2021-05-19 | End: 2021-05-19

## 2021-05-19 RX ORDER — MULTIVIT-MIN/FERROUS GLUCONATE 9 MG/15 ML
1 LIQUID (ML) ORAL DAILY
Refills: 0 | Status: DISCONTINUED | OUTPATIENT
Start: 2021-05-19 | End: 2021-05-21

## 2021-05-19 RX ORDER — CEFTRIAXONE 500 MG/1
INJECTION, POWDER, FOR SOLUTION INTRAMUSCULAR; INTRAVENOUS
Refills: 0 | Status: DISCONTINUED | OUTPATIENT
Start: 2021-05-19 | End: 2021-05-20

## 2021-05-19 RX ORDER — CEFTRIAXONE 500 MG/1
1000 INJECTION, POWDER, FOR SOLUTION INTRAMUSCULAR; INTRAVENOUS EVERY 24 HOURS
Refills: 0 | Status: DISCONTINUED | OUTPATIENT
Start: 2021-05-20 | End: 2021-05-20

## 2021-05-19 RX ADMIN — Medication 975 MILLIGRAM(S): at 15:20

## 2021-05-19 RX ADMIN — Medication 0.25 MILLIGRAM(S): at 20:47

## 2021-05-19 RX ADMIN — Medication 0.25 MILLIGRAM(S): at 06:01

## 2021-05-19 RX ADMIN — Medication 325 MILLIGRAM(S): at 06:01

## 2021-05-19 RX ADMIN — Medication 975 MILLIGRAM(S): at 06:00

## 2021-05-19 RX ADMIN — Medication 975 MILLIGRAM(S): at 22:10

## 2021-05-19 RX ADMIN — SENNA PLUS 2 TABLET(S): 8.6 TABLET ORAL at 20:47

## 2021-05-19 RX ADMIN — Medication 975 MILLIGRAM(S): at 14:22

## 2021-05-19 RX ADMIN — Medication 975 MILLIGRAM(S): at 23:05

## 2021-05-19 RX ADMIN — ESCITALOPRAM OXALATE 20 MILLIGRAM(S): 10 TABLET, FILM COATED ORAL at 12:19

## 2021-05-19 RX ADMIN — PANTOPRAZOLE SODIUM 40 MILLIGRAM(S): 20 TABLET, DELAYED RELEASE ORAL at 06:01

## 2021-05-19 RX ADMIN — TRAMADOL HYDROCHLORIDE 50 MILLIGRAM(S): 50 TABLET ORAL at 18:55

## 2021-05-19 RX ADMIN — SIMVASTATIN 20 MILLIGRAM(S): 20 TABLET, FILM COATED ORAL at 20:47

## 2021-05-19 RX ADMIN — TRAMADOL HYDROCHLORIDE 50 MILLIGRAM(S): 50 TABLET ORAL at 17:56

## 2021-05-19 RX ADMIN — Medication 2000 MILLIGRAM(S): at 04:21

## 2021-05-19 RX ADMIN — Medication 1 TABLET(S): at 12:18

## 2021-05-19 RX ADMIN — TRAMADOL HYDROCHLORIDE 50 MILLIGRAM(S): 50 TABLET ORAL at 23:05

## 2021-05-19 RX ADMIN — CEFTRIAXONE 100 MILLIGRAM(S): 500 INJECTION, POWDER, FOR SOLUTION INTRAMUSCULAR; INTRAVENOUS at 16:16

## 2021-05-19 RX ADMIN — TRAMADOL HYDROCHLORIDE 50 MILLIGRAM(S): 50 TABLET ORAL at 22:11

## 2021-05-19 RX ADMIN — Medication 325 MILLIGRAM(S): at 17:56

## 2021-05-19 RX ADMIN — Medication 975 MILLIGRAM(S): at 07:00

## 2021-05-19 RX ADMIN — Medication 1 MILLIGRAM(S): at 12:19

## 2021-05-19 NOTE — PHYSICAL THERAPY INITIAL EVALUATION ADULT - MANUAL MUSCLE TESTING RESULTS, REHAB EVAL
functional mobility testing; >/=3+/5 bilateral UE and LLE (RLE NT due to pain)/grossly assessed due to

## 2021-05-19 NOTE — PROGRESS NOTE ADULT - SUBJECTIVE AND OBJECTIVE BOX
Ortho Progress Note    Subjective:  Pt comfortable without complaints, pain controlled with medication. Appears comfortable.  Denies CP, SOB, N/V, numbness/tingling.    Objective:    Vital Signs Last 24 Hrs  T(C): 36.3 (05-19-21 @ 06:18), Max: 36.3 (05-19-21 @ 06:18)  T(F): 97.3 (05-19-21 @ 06:18), Max: 97.3 (05-19-21 @ 06:18)  HR: 95 (05-19-21 @ 06:18) (95 - 95)  BP: 176/76 (05-19-21 @ 06:18) (176/76 - 176/76)  BP(mean): --  RR: 18 (05-19-21 @ 06:18) (18 - 18)  SpO2: 98% (05-19-21 @ 06:18) (98% - 98%)  AVSS    Physical Exam:  General: Pt Alert and oriented, NAD  RLE:  Dressing C/D/I  Pulses: DP/PT palpable  Sensation: SILT throughout  Motor: 5/5 EHL/FHL/TA/GS    Labs:                        14.7   17.80 )-----------( 383      ( 17 May 2021 21:55 )             45.8     05-17    144  |  105  |  17  ----------------------------<  112<H>  5.0   |  29  |  0.70    Ca    9.9      17 May 2021 21:55  Mg     2.0     05-17    TPro  7.2  /  Alb  4.5  /  TBili  0.4  /  DBili  x   /  AST  22  /  ALT  17  /  AlkPhos  124<H>  05-17  \par     A/P: 89yFemale s/p R IM Nail by Dr. Peng on 5/18.  - Stable  - Pain Control  - DVT ppx: SCDs, ASA 325mg bid  - PT, WBS: WBAT  - DIspo: Pending PT eval    Ortho Pager [phone]

## 2021-05-19 NOTE — PHYSICAL THERAPY INITIAL EVALUATION ADULT - SITTING BALANCE: DYNAMIC
Principal Discharge DX:	CAD (coronary artery disease)  Goal:	Pt remains leg pain free and understands post cath discharge instructions  Instructions for follow-up, activity and diet:	No heavy lifting, strenuous activity, bending, straining or unnecessary stair climbing  for 2 weeks. No sex for 1 week.  No driving for 2 days. You may shower 24 hours following procedure but avoid baths and swimming for 1 week. Check groin site for bleeding and/or swelling daily following procedure. Call your doctor/cardiologist immediately should it occur or if you have increased/persistent pain at the site. Follow up with your cardiologist in 1- 2 weeks. You may call [facility] at [phone] or [phone] after office hours and weekends  with any questions or concerns following your procedure. Take medications as prescribed. DO NOT STOP YOUR ASPIRIN AND PLAVIX BEFORE SPEAKING WITH YOUR CARDIOLOGIST  Secondary Diagnosis:	HTN (hypertension)  Goal:	Your blood pressure will be controlled.  Instructions for follow-up, activity and diet:	Continue with your blood pressure medications; eat a heart healthy diet with low salt diet; exercise regularly (consult with your physician or cardiologist first); maintain a heart healthy weight; if you smoke - quit (A resource to help you stop smoking is the [facility] [employer] – phone number [phone].); include healthy ways to manage stress. Continue to follow with your primary care physician or cardiologist.  Secondary Diagnosis:	HLD (hyperlipidemia)  Goal:	Your LDL cholesterol will be less than 70mg/dL  Instructions for follow-up, activity and diet:	Continue with your cholesterol medications. Eat a heart healthy diet that is low in saturated fats and salt, and includes whole grains, fruits, vegetables and lean protein; exercise regularly (consult with your physician or cardiologist first); maintain a heart healthy weight. Continue to follow with your primary physician or cardiologist for treatment goals, continue medication, have liver function testing every 3 months as anti lipid medications can cause liver irritation. If you smoke - quit (A resource to help you stop smoking is the [facility] [employer] – phone number [phone].). poor plus Principal Discharge DX:	CAD (coronary artery disease)  Goal:	Pt remains leg pain free and understands post cath discharge instructions  Instructions for follow-up, activity and diet:	No heavy lifting, strenuous activity, bending, straining or unnecessary stair climbing  for 2 weeks. No sex for 1 week.  No driving for 2 days. You may shower 24 hours following procedure but avoid baths and swimming for 1 week. Check groin site for bleeding and/or swelling daily following procedure. Call your doctor/cardiologist immediately should it occur or if you have increased/persistent pain at the site. Follow up with your cardiologist in 1- 2 weeks. You may call [facility] at [phone] or [phone] after office hours and weekends  with any questions or concerns following your procedure. Take medications as prescribed. DO NOT STOP YOUR ASPIRIN AND PLAVIX BEFORE SPEAKING WITH YOUR CARDIOLOGIST  Secondary Diagnosis:	HTN (hypertension)  Goal:	Your blood pressure will be controlled.  Instructions for follow-up, activity and diet:	Continue with your blood pressure medications; eat a heart healthy diet with low salt diet; exercise regularly (consult with your physician or cardiologist first); maintain a heart healthy weight; if you smoke - quit (A resource to help you stop smoking is the [facility] [employer] – phone number [phone].); include healthy ways to manage stress. Continue to follow with your primary care physician or cardiologist.  Secondary Diagnosis:	HLD (hyperlipidemia)  Goal:	Your LDL cholesterol will be less than 70mg/dL  Instructions for follow-up, activity and diet:	Continue with your cholesterol medications. Eat a heart healthy diet that is low in saturated fats and salt, and includes whole grains, fruits, vegetables and lean protein; exercise regularly (consult with your physician or cardiologist first); maintain a heart healthy weight. Continue to follow with your primary physician or cardiologist for treatment goals, continue medication, have liver function testing every 3 months as anti lipid medications can cause liver irritation. If you smoke - quit (A resource to help you stop smoking is the [facility] [employer] – phone number [phone].). Principal Discharge DX:	CAD (coronary artery disease)  Goal:	Pt remains leg pain free and understands post cath discharge instructions  Instructions for follow-up, activity and diet:	No heavy lifting, strenuous activity, bending, straining or unnecessary stair climbing  for 2 weeks. No sex for 1 week.  No driving for 2 days. You may shower 24 hours following procedure but avoid baths and swimming for 1 week. Check groin site for bleeding and/or swelling daily following procedure. Call your doctor/cardiologist immediately should it occur or if you have increased/persistent pain at the site. Follow up with your cardiologist in 1- 2 weeks. You may call [facility] at [phone] or [phone] after office hours and weekends  with any questions or concerns following your procedure. Take medications as prescribed. DO NOT STOP YOUR ASPIRIN AND PLAVIX BEFORE SPEAKING WITH YOUR CARDIOLOGIST  Secondary Diagnosis:	HTN (hypertension)  Goal:	Your blood pressure will be controlled.  Instructions for follow-up, activity and diet:	Continue with your blood pressure medications; eat a heart healthy diet with low salt diet; exercise regularly (consult with your physician or cardiologist first); maintain a heart healthy weight; if you smoke - quit (A resource to help you stop smoking is the [facility] [employer] – phone number [phone].); include healthy ways to manage stress. Continue to follow with your primary care physician or cardiologist.  Secondary Diagnosis:	HLD (hyperlipidemia)  Goal:	Your LDL cholesterol will be less than 70mg/dL  Instructions for follow-up, activity and diet:	Continue with your cholesterol medications. Eat a heart healthy diet that is low in saturated fats and salt, and includes whole grains, fruits, vegetables and lean protein; exercise regularly (consult with your physician or cardiologist first); maintain a heart healthy weight. Continue to follow with your primary physician or cardiologist for treatment goals, continue medication, have liver function testing every 3 months as anti lipid medications can cause liver irritation. If you smoke - quit (A resource to help you stop smoking is the [facility] [employer] – phone number [phone].). Principal Discharge DX:	PAD (peripheral artery disease)  Goal:	Pt remains leg pain free and understands post cath discharge instructions  Instructions for follow-up, activity and diet:	No heavy lifting, strenuous activity, bending, straining or unnecessary stair climbing  for 2 weeks. No sex for 1 week.  No driving for 2 days. You may shower 24 hours following procedure but avoid baths and swimming for 1 week. Check groin site for bleeding and/or swelling daily following procedure. Call your doctor/cardiologist immediately should it occur or if you have increased/persistent pain at the site. Follow up with your cardiologist in 1- 2 weeks. You may call [facility] at [phone] or [phone] after office hours and weekends  with any questions or concerns following your procedure. Take medications as prescribed. DO NOT STOP YOUR ASPIRIN AND PLAVIX BEFORE SPEAKING WITH YOUR CARDIOLOGIST  Secondary Diagnosis:	HTN (hypertension)  Goal:	Your blood pressure will be controlled.  Instructions for follow-up, activity and diet:	Continue with your blood pressure medications; eat a heart healthy diet with low salt diet; exercise regularly (consult with your physician or cardiologist first); maintain a heart healthy weight; if you smoke - quit (A resource to help you stop smoking is the [facility] [employer] – phone number [phone].); include healthy ways to manage stress. Continue to follow with your primary care physician or cardiologist.  Secondary Diagnosis:	HLD (hyperlipidemia)  Goal:	Your LDL cholesterol will be less than 70mg/dL  Instructions for follow-up, activity and diet:	Continue with your cholesterol medications. Eat a heart healthy diet that is low in saturated fats and salt, and includes whole grains, fruits, vegetables and lean protein; exercise regularly (consult with your physician or cardiologist first); maintain a heart healthy weight. Continue to follow with your primary physician or cardiologist for treatment goals, continue medication, have liver function testing every 3 months as anti lipid medications can cause liver irritation. If you smoke - quit (A resource to help you stop smoking is the [facility] [employer] – phone number [phone].).  Secondary Diagnosis:	Coronary artery disease involving native coronary artery of native heart without angina pectoris  Goal:	Pt is free from chest pain  Instructions for follow-up, activity and diet:	Pt remains chest pain free and understands post cath discharge instructions  Secondary Diagnosis:	Smoking trying to quit  Goal:	You will continue not to smoke.  Instructions for follow-up, activity and diet:	If you are on medication to help you quit smoking, be sure to take it as prescribed. Find healthy ways to deal with stress, such as exercise (check with your healthcare provider first), deep breathing, meditation, or enjoyable healthy hobbies.  Avoid situations that may cause you to smoke a cigarette.  Look for help with quitting; you are not alone. A resource to help you stop smoking is the [facility] – phone number [phone]. Principal Discharge DX:	PAD (peripheral artery disease)  Goal:	Pt remains leg pain free and understands post cath discharge instructions  Instructions for follow-up, activity and diet:	No heavy lifting, strenuous activity, bending, straining or unnecessary stair climbing  for 2 weeks. No sex for 1 week.  No driving for 2 days. You may shower 24 hours following procedure but avoid baths and swimming for 1 week. Check groin site for bleeding and/or swelling daily following procedure. Call your doctor/cardiologist immediately should it occur or if you have increased/persistent pain at the site. Follow up with your cardiologist in 1- 2 weeks. You may call [facility] at [phone] or [phone] after office hours and weekends  with any questions or concerns following your procedure. Take medications as prescribed. DO NOT STOP YOUR ASPIRIN AND PLAVIX BEFORE SPEAKING WITH YOUR CARDIOLOGIST  Secondary Diagnosis:	HTN (hypertension)  Goal:	Your blood pressure will be controlled.  Instructions for follow-up, activity and diet:	Continue with your blood pressure medications; eat a heart healthy diet with low salt diet; exercise regularly (consult with your physician or cardiologist first); maintain a heart healthy weight; if you smoke - quit (A resource to help you stop smoking is the [facility] [employer] – phone number [phone].); include healthy ways to manage stress. Continue to follow with your primary care physician or cardiologist.  Secondary Diagnosis:	HLD (hyperlipidemia)  Goal:	Your LDL cholesterol will be less than 70mg/dL  Instructions for follow-up, activity and diet:	Continue with your cholesterol medications. Eat a heart healthy diet that is low in saturated fats and salt, and includes whole grains, fruits, vegetables and lean protein; exercise regularly (consult with your physician or cardiologist first); maintain a heart healthy weight. Continue to follow with your primary physician or cardiologist for treatment goals, continue medication, have liver function testing every 3 months as anti lipid medications can cause liver irritation. If you smoke - quit (A resource to help you stop smoking is the [facility] [employer] – phone number [phone].).  Secondary Diagnosis:	Coronary artery disease involving native coronary artery of native heart without angina pectoris  Goal:	Pt is free from chest pain  Instructions for follow-up, activity and diet:	Pt remains chest pain free and understands post cath discharge instructions  Secondary Diagnosis:	Smoking trying to quit  Goal:	You will continue not to smoke.  Instructions for follow-up, activity and diet:	If you are on medication to help you quit smoking, be sure to take it as prescribed. Find healthy ways to deal with stress, such as exercise (check with your healthcare provider first), deep breathing, meditation, or enjoyable healthy hobbies.  Avoid situations that may cause you to smoke a cigarette.  Look for help with quitting; you are not alone. A resource to help you stop smoking is the [facility] – phone number [phone]. Principal Discharge DX:	PAD (peripheral artery disease)  Goal:	Pt remains leg pain free and understands post cath discharge instructions  Instructions for follow-up, activity and diet:	No heavy lifting, strenuous activity, bending, straining or unnecessary stair climbing  for 2 weeks. No sex for 1 week.  No driving for 2 days. You may shower 24 hours following procedure but avoid baths and swimming for 1 week. Check groin site for bleeding and/or swelling daily following procedure. Call your doctor/cardiologist immediately should it occur or if you have increased/persistent pain at the site. Follow up with your cardiologist in 1- 2 weeks. You may call [facility] at [phone] or [phone] after office hours and weekends  with any questions or concerns following your procedure. Take medications as prescribed. DO NOT STOP YOUR ASPIRIN AND PLAVIX BEFORE SPEAKING WITH YOUR CARDIOLOGIST  Secondary Diagnosis:	HTN (hypertension)  Goal:	Your blood pressure will be controlled.  Instructions for follow-up, activity and diet:	Continue with your blood pressure medications; eat a heart healthy diet with low salt diet; exercise regularly (consult with your physician or cardiologist first); maintain a heart healthy weight; if you smoke - quit (A resource to help you stop smoking is the [facility] – phone number [phone].); include healthy ways to manage stress. Continue to follow with your primary care physician or cardiologist.  Secondary Diagnosis:	HLD (hyperlipidemia)  Goal:	Your LDL cholesterol will be less than 70mg/dL  Instructions for follow-up, activity and diet:	Continue with your cholesterol medications. Eat a heart healthy diet that is low in saturated fats and salt, and includes whole grains, fruits, vegetables and lean protein; exercise regularly (consult with your physician or cardiologist first); maintain a heart healthy weight. Continue to follow with your primary physician or cardiologist for treatment goals, continue medication, have liver function testing every 3 months as anti lipid medications can cause liver irritation. If you smoke - quit (A resource to help you stop smoking is the [facility] – phone number [phone].).  Secondary Diagnosis:	Coronary artery disease involving native coronary artery of native heart without angina pectoris  Goal:	Pt is free from chest pain  Instructions for follow-up, activity and diet:	Pt remains chest pain free and understands post cath discharge instructions  Secondary Diagnosis:	Smoking trying to quit  Goal:	You will continue not to smoke.  Instructions for follow-up, activity and diet:	If you are on medication to help you quit smoking, be sure to take it as prescribed. Find healthy ways to deal with stress, such as exercise (check with your healthcare provider first), deep breathing, meditation, or enjoyable healthy hobbies.  Avoid situations that may cause you to smoke a cigarette.  Look for help with quitting; you are not alone. A resource to help you stop smoking is the [facility] – phone number [phone]. Principal Discharge DX:	PAD (peripheral artery disease)  Goal:	Pt remains leg pain free and understands post cath discharge instructions  Instructions for follow-up, activity and diet:	No heavy lifting, strenuous activity, bending, straining or unnecessary stair climbing  for 2 weeks. No sex for 1 week.  No driving for 2 days. You may shower 24 hours following procedure but avoid baths and swimming for 1 week. Check groin site for bleeding and/or swelling daily following procedure. Call your doctor/cardiologist immediately should it occur or if you have increased/persistent pain at the site. Follow up with your cardiologist in 1- 2 weeks. You may call [facility] at [phone] or [phone] after office hours and weekends  with any questions or concerns following your procedure. Take medications as prescribed. DO NOT STOP YOUR ASPIRIN AND PLAVIX BEFORE SPEAKING WITH YOUR CARDIOLOGIST  Secondary Diagnosis:	HTN (hypertension)  Goal:	Your blood pressure will be controlled.  Instructions for follow-up, activity and diet:	Continue with your blood pressure medications; eat a heart healthy diet with low salt diet; exercise regularly (consult with your physician or cardiologist first); maintain a heart healthy weight; if you smoke - quit (A resource to help you stop smoking is the [facility] [employer] – phone number [phone].); include healthy ways to manage stress. Continue to follow with your primary care physician or cardiologist.  Secondary Diagnosis:	HLD (hyperlipidemia)  Goal:	Your LDL cholesterol will be less than 70mg/dL  Instructions for follow-up, activity and diet:	Continue with your cholesterol medications. Eat a heart healthy diet that is low in saturated fats and salt, and includes whole grains, fruits, vegetables and lean protein; exercise regularly (consult with your physician or cardiologist first); maintain a heart healthy weight. Continue to follow with your primary physician or cardiologist for treatment goals, continue medication, have liver function testing every 3 months as anti lipid medications can cause liver irritation. If you smoke - quit (A resource to help you stop smoking is the [facility] [employer] – phone number [phone].).  Secondary Diagnosis:	Coronary artery disease involving native coronary artery of native heart without angina pectoris  Goal:	Pt is free from chest pain  Instructions for follow-up, activity and diet:	Pt remains chest pain free and understands post cath discharge instructions  Secondary Diagnosis:	Smoking trying to quit  Goal:	You will continue not to smoke.  Instructions for follow-up, activity and diet:	If you are on medication to help you quit smoking, be sure to take it as prescribed. Find healthy ways to deal with stress, such as exercise (check with your healthcare provider first), deep breathing, meditation, or enjoyable healthy hobbies.  Avoid situations that may cause you to smoke a cigarette.  Look for help with quitting; you are not alone. A resource to help you stop smoking is the [facility] – phone number [phone]. Principal Discharge DX:	PAD (peripheral artery disease)  Goal:	Pt remains leg pain free and understands post cath discharge instructions  Instructions for follow-up, activity and diet:	No heavy lifting, strenuous activity, bending, straining or unnecessary stair climbing  for 2 weeks. No sex for 1 week.  No driving for 2 days. You may shower 24 hours following procedure but avoid baths and swimming for 1 week. Check groin site for bleeding and/or swelling daily following procedure. Call your doctor/cardiologist immediately should it occur or if you have increased/persistent pain at the site. Follow up with your cardiologist in 1- 2 weeks. You may call [facility] at [phone] or [phone] after office hours and weekends  with any questions or concerns following your procedure. Take medications as prescribed. DO NOT STOP YOUR ASPIRIN AND PLAVIX BEFORE SPEAKING WITH YOUR CARDIOLOGIST  Secondary Diagnosis:	HTN (hypertension)  Goal:	Your blood pressure will be controlled.  Instructions for follow-up, activity and diet:	Continue with your blood pressure medications; eat a heart healthy diet with low salt diet; exercise regularly (consult with your physician or cardiologist first); maintain a heart healthy weight; if you smoke - quit (A resource to help you stop smoking is the [facility] [employer] – phone number [phone].); include healthy ways to manage stress. Continue to follow with your primary care physician or cardiologist.  Secondary Diagnosis:	HLD (hyperlipidemia)  Goal:	Your LDL cholesterol will be less than 70mg/dL  Instructions for follow-up, activity and diet:	Continue with your cholesterol medications. Eat a heart healthy diet that is low in saturated fats and salt, and includes whole grains, fruits, vegetables and lean protein; exercise regularly (consult with your physician or cardiologist first); maintain a heart healthy weight. Continue to follow with your primary physician or cardiologist for treatment goals, continue medication, have liver function testing every 3 months as anti lipid medications can cause liver irritation. If you smoke - quit (A resource to help you stop smoking is the [facility] [employer] – phone number [phone].).  Secondary Diagnosis:	Coronary artery disease involving native coronary artery of native heart without angina pectoris  Goal:	Pt is free from chest pain  Instructions for follow-up, activity and diet:	Pt remains chest pain free and understands post cath discharge instructions  Secondary Diagnosis:	Smoking trying to quit  Goal:	You will continue not to smoke.  Instructions for follow-up, activity and diet:	If you are on medication to help you quit smoking, be sure to take it as prescribed. Find healthy ways to deal with stress, such as exercise (check with your healthcare provider first), deep breathing, meditation, or enjoyable healthy hobbies.  Avoid situations that may cause you to smoke a cigarette.  Look for help with quitting; you are not alone. A resource to help you stop smoking is the [facility] – phone number [phone].

## 2021-05-19 NOTE — PHYSICAL THERAPY INITIAL EVALUATION ADULT - ADDITIONAL COMMENTS
Per pt's daughter Ayana at bedside, prior to hospitalization, pt was ambulating independently with no DME in the home, and with [place] in the community. Up until day of fall, pt had been living in apt with daughter Ayana assisting as needed. On the day of the fall, pt had moved in to an assisted living facility memory care unit.

## 2021-05-19 NOTE — PROGRESS NOTE ADULT - SUBJECTIVE AND OBJECTIVE BOX
Patient is a 89y old  Female who presents with a chief complaint of R IT fx (19 May 2021 08:00)      INTERVAL HPI/OVERNIGHT EVENTS:  #pod1    Subjective:  patient examined at bedside today - has no concerns/complaints this morning, feels pain is controlled     Review of Systems: 12 point review of systems otherwise negative    MEDICATIONS  (STANDING):  acetaminophen   Tablet .. 975 milliGRAM(s) Oral every 8 hours  ALPRAZolam 0.25 milliGRAM(s) Oral two times a day  aspirin 325 milliGRAM(s) Oral two times a day  escitalopram 20 milliGRAM(s) Oral daily  folic acid 1 milliGRAM(s) Oral daily  lactated ringers. 1000 milliLiter(s) (100 mL/Hr) IV Continuous <Continuous>  multivitamin/minerals 1 Tablet(s) Oral daily  pantoprazole    Tablet 40 milliGRAM(s) Oral before breakfast  senna 2 Tablet(s) Oral at bedtime  simvastatin 20 milliGRAM(s) Oral at bedtime    MEDICATIONS  (PRN):  magnesium hydroxide Suspension 30 milliLiter(s) Oral daily PRN Constipation  ondansetron Injectable 4 milliGRAM(s) IV Push every 4 hours PRN Nausea and/or Vomiting  traMADol 25 milliGRAM(s) Oral every 4 hours PRN Moderate Pain (4 - 6)  traMADol 50 milliGRAM(s) Oral every 4 hours PRN Severe Pain (7 - 10)      Allergies    No Known Allergies    Intolerances          Vital Signs Last 24 Hrs  T(C): 36.3 (19 May 2021 09:21), Max: 36.8 (18 May 2021 21:45)  T(F): 97.4 (19 May 2021 09:21), Max: 98.3 (18 May 2021 21:45)  HR: 96 (19 May 2021 09:21) (76 - 106)  BP: 164/79 (19 May 2021 09:21) (129/71 - 176/76)  BP(mean): 92 (18 May 2021 21:37) (92 - 105)  RR: 18 (19 May 2021 06:18) (17 - 37)  SpO2: 98% (19 May 2021 06:18) (94% - 98%)  CAPILLARY BLOOD GLUCOSE          05-18 @ 07:01  -  05-19 @ 07:00  --------------------------------------------------------  IN: 2865 mL / OUT: 0 mL / NET: 2865 mL     @ 07:01  -   @ 15:42  --------------------------------------------------------  IN: 520 mL / OUT: 0 mL / NET: 520 mL        Physical Exam:    Daily     Daily   General:  Well appearing, NAD, not cachetic  HEENT:  Nonicteric, PERRLA  CV:  RRR, no murmur, no JVD  Lungs:  CTA B/L, no wheezes, rales, rhonchi  Abdomen:  Soft, non-tender, no distended, positive BS, no hepatosplenomegaly  Extremities:  2+ pulses, no c/c, no edema;  dsg c/d/i  Skin:  Warm and dry, no rashes  :  No hardy  Neuro:  AAOx3, non-focal, CN II-XII grossly intact  No Restraints    LABS:                        10.5   19.12 )-----------( 346      ( 19 May 2021 08:50 )             32.1         140  |  103  |  15  ----------------------------<  115<H>  3.6   |  25  |  0.58    Ca    9.2      19 May 2021 08:50  Mg     2.0         TPro  7.2  /  Alb  4.5  /  TBili  0.4  /  DBili  x   /  AST  22  /  ALT  17  /  AlkPhos  124<H>      PT/INR - ( 18 May 2021 00:40 )   PT: 12.4 sec;   INR: 1.04          PTT - ( 18 May 2021 00:40 )  PTT:30.2 sec  Urinalysis Basic - ( 17 May 2021 21:13 )    Color: Yellow / Appearance: Clear / S.025 / pH: x  Gluc: x / Ketone: NEGATIVE  / Bili: Negative / Urobili: 0.2 E.U./dL   Blood: x / Protein: Trace mg/dL / Nitrite: NEGATIVE   Leuk Esterase: Small / RBC: < 5 /HPF / WBC > 10 /HPF   Sq Epi: x / Non Sq Epi: Moderate /HPF / Bacteria: Present /HPF          RADIOLOGY & ADDITIONAL TESTS:  reviewed

## 2021-05-19 NOTE — PHYSICAL THERAPY INITIAL EVALUATION ADULT - IMPAIRED TRANSFERS: SIT/STAND, REHAB EVAL
decreased aerobic capacity/endurance/impaired balance/cognition/pain/impaired postural control/decreased ROM/decreased strength

## 2021-05-19 NOTE — PHYSICAL THERAPY INITIAL EVALUATION ADULT - ACTIVE RANGE OF MOTION EXAMINATION, REHAB EVAL
RLE AROM NT due to pain/bilateral upper extremity Active ROM was WFL (within functional limits)/Left LE Active ROM was WFL (within functional limits)

## 2021-05-19 NOTE — PHYSICAL THERAPY INITIAL EVALUATION ADULT - IMPAIRMENTS FOUND, PT EVAL
aerobic capacity/endurance/arousal, attention, and cognition/cognitive impairment/gait, locomotion, and balance/muscle strength/poor safety awareness/posture/ROM

## 2021-05-19 NOTE — PHYSICAL THERAPY INITIAL EVALUATION ADULT - GAIT DEVIATIONS NOTED, PT EVAL
min unsteadiness, poor sequencing steps with turning/decreased shahana/decreased step length/decreased stride length/decreased weight-shifting ability

## 2021-05-19 NOTE — PHYSICAL THERAPY INITIAL EVALUATION ADULT - GENERAL OBSERVATIONS, REHAB EVAL
Pt received supine, +R hip incision bandages C/D/I, +telemetry, +pulse ox, +IV, NAD, agreeable to PT.

## 2021-05-19 NOTE — PHYSICAL THERAPY INITIAL EVALUATION ADULT - PERTINENT HX OF CURRENT PROBLEM, REHAB EVAL
89F hx dementia, HTN and HLD BIBEMS s/p unwitnessed fall with R hip pain. Felt immediate pain and unable to bear weight.

## 2021-05-19 NOTE — PROGRESS NOTE ADULT - SUBJECTIVE AND OBJECTIVE BOX
Ortho Note    Pt comfortable without complaints, pain controlled, per PCA bedside states patient keeps trying to get OOB.  Denies CP, SOB, N/V, numbness/tingling     Vital Signs Last 24 Hrs  AVSS  I&O's Summary    18 May 2021 07:01  -  19 May 2021 07:00  --------------------------------------------------------  IN: 2865 mL / OUT: 0 mL / NET: 2865 mL    19 May 2021 07:01  -  19 May 2021 16:36  --------------------------------------------------------  IN: 520 mL / OUT: 0 mL / NET: 520 mL        General: Pt Alert and oriented x 1-2, NAD  DSG C/D/I Right hip  Pulses intact RLE  Sensation intact RLE  Motor: EHL/FHL/TA/GS 5/5 RLE                          10.5   19.12 )-----------( 346      ( 19 May 2021 08:50 )             32.1     05-19    140  |  103  |  15  ----------------------------<  115<H>  3.6   |  25  |  0.58    Ca    9.2      19 May 2021 08:50  Mg     2.0     05-17    TPro  7.2  /  Alb  4.5  /  TBili  0.4  /  DBili  x   /  AST  22  /  ALT  17  /  AlkPhos  124<H>  05-17      A/P: 89yFemale POD#1 s/p Right hip IM Nail  - Stable  - Pain Control  - DVT ppx: asa  - PT, WBS: wbat  - ceftriaxone for uti  - speech and swallow consult  - psych consulted  - plan discussed with daughter bedside    Ortho Pager [phone]

## 2021-05-20 LAB
-  CIPROFLOXACIN: SIGNIFICANT CHANGE UP
ANION GAP SERPL CALC-SCNC: 12 MMOL/L — SIGNIFICANT CHANGE UP (ref 5–17)
BUN SERPL-MCNC: 10 MG/DL — SIGNIFICANT CHANGE UP (ref 7–23)
CALCIUM SERPL-MCNC: 9.5 MG/DL — SIGNIFICANT CHANGE UP (ref 8.4–10.5)
CHLORIDE SERPL-SCNC: 104 MMOL/L — SIGNIFICANT CHANGE UP (ref 96–108)
CO2 SERPL-SCNC: 26 MMOL/L — SIGNIFICANT CHANGE UP (ref 22–31)
CREAT SERPL-MCNC: 0.58 MG/DL — SIGNIFICANT CHANGE UP (ref 0.5–1.3)
CULTURE RESULTS: SIGNIFICANT CHANGE UP
GLUCOSE SERPL-MCNC: 110 MG/DL — HIGH (ref 70–99)
HCT VFR BLD CALC: 31.9 % — LOW (ref 34.5–45)
HGB BLD-MCNC: 10.2 G/DL — LOW (ref 11.5–15.5)
MAGNESIUM SERPL-MCNC: 1.8 MG/DL — SIGNIFICANT CHANGE UP (ref 1.6–2.6)
MCHC RBC-ENTMCNC: 29.2 PG — SIGNIFICANT CHANGE UP (ref 27–34)
MCHC RBC-ENTMCNC: 32 GM/DL — SIGNIFICANT CHANGE UP (ref 32–36)
MCV RBC AUTO: 91.4 FL — SIGNIFICANT CHANGE UP (ref 80–100)
METHOD TYPE: SIGNIFICANT CHANGE UP
NRBC # BLD: 0 /100 WBCS — SIGNIFICANT CHANGE UP (ref 0–0)
ORGANISM # SPEC MICROSCOPIC CNT: SIGNIFICANT CHANGE UP
PLATELET # BLD AUTO: 342 K/UL — SIGNIFICANT CHANGE UP (ref 150–400)
POTASSIUM SERPL-MCNC: 3.1 MMOL/L — LOW (ref 3.5–5.3)
POTASSIUM SERPL-SCNC: 3.1 MMOL/L — LOW (ref 3.5–5.3)
RBC # BLD: 3.49 M/UL — LOW (ref 3.8–5.2)
RBC # FLD: 13.5 % — SIGNIFICANT CHANGE UP (ref 10.3–14.5)
SODIUM SERPL-SCNC: 142 MMOL/L — SIGNIFICANT CHANGE UP (ref 135–145)
SPECIMEN SOURCE: SIGNIFICANT CHANGE UP
WBC # BLD: 16.15 K/UL — HIGH (ref 3.8–10.5)
WBC # FLD AUTO: 16.15 K/UL — HIGH (ref 3.8–10.5)

## 2021-05-20 PROCEDURE — 99232 SBSQ HOSP IP/OBS MODERATE 35: CPT

## 2021-05-20 PROCEDURE — 99222 1ST HOSP IP/OBS MODERATE 55: CPT

## 2021-05-20 RX ORDER — POTASSIUM CHLORIDE 20 MEQ
40 PACKET (EA) ORAL ONCE
Refills: 0 | Status: COMPLETED | OUTPATIENT
Start: 2021-05-20 | End: 2021-05-20

## 2021-05-20 RX ORDER — HALOPERIDOL DECANOATE 100 MG/ML
0.5 INJECTION INTRAMUSCULAR EVERY 8 HOURS
Refills: 0 | Status: DISCONTINUED | OUTPATIENT
Start: 2021-05-20 | End: 2021-05-21

## 2021-05-20 RX ORDER — ALPRAZOLAM 0.25 MG
0 TABLET ORAL
Qty: 0 | Refills: 0 | DISCHARGE

## 2021-05-20 RX ADMIN — Medication 325 MILLIGRAM(S): at 06:11

## 2021-05-20 RX ADMIN — Medication 40 MILLIEQUIVALENT(S): at 11:14

## 2021-05-20 RX ADMIN — SENNA PLUS 2 TABLET(S): 8.6 TABLET ORAL at 22:10

## 2021-05-20 RX ADMIN — Medication 975 MILLIGRAM(S): at 07:00

## 2021-05-20 RX ADMIN — Medication 975 MILLIGRAM(S): at 23:00

## 2021-05-20 RX ADMIN — ESCITALOPRAM OXALATE 20 MILLIGRAM(S): 10 TABLET, FILM COATED ORAL at 11:14

## 2021-05-20 RX ADMIN — PANTOPRAZOLE SODIUM 40 MILLIGRAM(S): 20 TABLET, DELAYED RELEASE ORAL at 06:11

## 2021-05-20 RX ADMIN — Medication 0.25 MILLIGRAM(S): at 06:11

## 2021-05-20 RX ADMIN — Medication 975 MILLIGRAM(S): at 14:13

## 2021-05-20 RX ADMIN — Medication 975 MILLIGRAM(S): at 15:13

## 2021-05-20 RX ADMIN — Medication 975 MILLIGRAM(S): at 06:11

## 2021-05-20 RX ADMIN — Medication 975 MILLIGRAM(S): at 22:10

## 2021-05-20 RX ADMIN — Medication 325 MILLIGRAM(S): at 18:07

## 2021-05-20 RX ADMIN — Medication 0.25 MILLIGRAM(S): at 22:10

## 2021-05-20 RX ADMIN — Medication 1 MILLIGRAM(S): at 11:14

## 2021-05-20 RX ADMIN — SIMVASTATIN 20 MILLIGRAM(S): 20 TABLET, FILM COATED ORAL at 22:10

## 2021-05-20 RX ADMIN — Medication 1 TABLET(S): at 11:14

## 2021-05-20 NOTE — DIETITIAN NUTRITION RISK NOTIFICATION - ADDITIONAL COMMENTS/DIETITIAN RECOMMENDATIONS
1. Cont with mech soft diet with thin liquids  >> Cont to adjust diet consistency per SLP recs  >> Recommend addition of Ensure Enlive BID (700 kcal, 40g protein, 360 mL free H2O)  >> Cont with MVI supplementation  2. Pain and bowel regime per team   3. Cont to monitor lytes and replete prn  4. RD diet edu prn

## 2021-05-20 NOTE — OCCUPATIONAL THERAPY INITIAL EVALUATION ADULT - DIAGNOSIS, OT EVAL
lethargic, with deficits in functional balance, strength and activity tolerance, resulting in decreased ADLs/functional mobility

## 2021-05-20 NOTE — OCCUPATIONAL THERAPY INITIAL EVALUATION ADULT - IMPAIRMENTS CONTRIBUTING IMPAIRED BED MOBILITY, REHAB EVAL
impaired balance/cognition/decreased flexibility/pain/impaired postural control/decreased ROM/decreased strength

## 2021-05-20 NOTE — PROGRESS NOTE ADULT - SUBJECTIVE AND OBJECTIVE BOX
Ortho Progress Note    Subjective:  Pt comfortable without complaints, pain controlled with medication. Appears comfortable.  Denies CP, SOB, N/V, numbness/tingling.    Objective:    Vital Signs Last 24 Hrs  T(C): 36.3 (20 May 2021 06:01), Max: 36.3 (19 May 2021 06:18)  T(F): 97.3 (20 May 2021 06:01), Max: 97.4 (19 May 2021 09:21)  HR: 83 (20 May 2021 06:01) (79 - 107)  BP: 130/99 (20 May 2021 06:01) (130/99 - 176/76)  BP(mean): --  RR: 18 (20 May 2021 06:01) (18 - 18)  SpO2: 83% (20 May 2021 06:01) (83% - 98%)    Physical Exam:  General: Pt Alert and oriented, NAD  RLE:  Dressing C/D/I  Pulses: DP/PT palpable  Sensation: SILT throughout  Motor: 5/5 EHL/FHL/TA/GS    Labs:                        14.7   17.80 )-----------( 383      ( 17 May 2021 21:55 )             45.8     05-17    144  |  105  |  17  ----------------------------<  112<H>  5.0   |  29  |  0.70    Ca    9.9      17 May 2021 21:55  Mg     2.0     05-17    TPro  7.2  /  Alb  4.5  /  TBili  0.4  /  DBili  x   /  AST  22  /  ALT  17  /  AlkPhos  124<H>  05-17  \par     A/P: 89yFemale s/p R IM Nail by Dr. Peng on 5/18.  - Stable  - Pain Control  - DVT ppx: SCDs, ASA 325mg bid  - PT, WBS: WBAT  - DIspo: TIFFANY    Ortho Pager [phone] Ortho Progress Note    Subjective:  Pt sundowns and becomes agitated at night.  Denies CP, SOB, N/V, numbness/tingling.    Objective:    Vital Signs Last 24 Hrs  T(C): 36.3 (20 May 2021 06:01), Max: 36.3 (19 May 2021 06:18)  T(F): 97.3 (20 May 2021 06:01), Max: 97.4 (19 May 2021 09:21)  HR: 83 (20 May 2021 06:01) (79 - 107)  BP: 130/99 (20 May 2021 06:01) (130/99 - 176/76)  BP(mean): --  RR: 18 (20 May 2021 06:01) (18 - 18)  SpO2: 83% (20 May 2021 06:01) (83% - 98%)    Physical Exam:  General: Pt Alert and oriented, NAD  RLE:  Dressing C/D/I  Pulses: DP/PT palpable  Sensation: SILT throughout  Motor: 5/5 EHL/FHL/TA/GS    Labs:                        14.7   17.80 )-----------( 383      ( 17 May 2021 21:55 )             45.8     05-17    144  |  105  |  17  ----------------------------<  112<H>  5.0   |  29  |  0.70    Ca    9.9      17 May 2021 21:55  Mg     2.0     05-17    TPro  7.2  /  Alb  4.5  /  TBili  0.4  /  DBili  x   /  AST  22  /  ALT  17  /  AlkPhos  124<H>  05-17  \par     A/P: 89yFemale s/p R IM Nail by Dr. Peng on 5/18.  - Stable  - Pain Control  - Ceftriaxone 1g q24h for UTI  - DVT ppx: SCDs, ASA 325mg bid  - PT, WBS: WBAT  - DIspo: TIFFANY    Ortho Pager [phone]

## 2021-05-20 NOTE — OCCUPATIONAL THERAPY INITIAL EVALUATION ADULT - MANUAL MUSCLE TESTING RESULTS, REHAB EVAL
increased lethargy impacting ability to follow commands; B/L UE 3/5, LLE 3-/5, RLE 2+/5/grossly assessed due to

## 2021-05-20 NOTE — BEHAVIORAL HEALTH ASSESSMENT NOTE - HPI (INCLUDE ILLNESS QUALITY, SEVERITY, DURATION, TIMING, CONTEXT, MODIFYING FACTORS, ASSOCIATED SIGNS AND SYMPTOMS)
89 year old F, no pph, pmh of dementia, HTN p/w R IT fracture after mechanical fall now s/p R IM Nail #pod2; Psychiatry consulted for agitation.  As per 1:1 pt has been asleep all morning. Writer attempted to evaluate pt bedside in the presence of 1:1 however pt did not awaken to verbal stimulus.      As per daughter, Ayana Grace ([phone]), pt has been suffering with "pretty severe dementia" for several months.  Symptoms of pt's dementia include delusional thinking, pt reporting she believes there to be "kids in the basement," as well as VH of pt's  mother, and additionally episodes of severe agitation.  Pt has been observed to be "weeping," and  Lexapro has been titrated over the past 4 months, increased to 20mg approx 1month ago.  For agitation, pt's neurologist prescribed Seroquel 25mg which pt took once "and totally freaked out," then a lower dose of 12.5mg was given a few times also with poor effect. Risperdal, dose unknown, was then trialed, which was also observed to increase the severity of the pt's agitation after she received several doses. More recently earlier this month, after "one particularly bad week" when police were called several times as pt was leaving her house and wandering the street, pt was prescribed Ativan by her PCP, Dr. Ritter, which seems to have had good effect.   Daughter reports although pt's mental status has been declining over the past several months, it seems to have significantly worsened the past few days following her recent fall.  Daughter reports pt. has never seen a psychiatrist before, has never been psychiatrically hospitalized, and has never attempted to hurt herself or others.  Daughter reports 50 years ago pt used to have anxiety attacks and may have been prescribed medication at that time. Pt. is a previous smoker, quit 30 years ago, and has had 2 glasses of wine at night until she was recently placed in the Nursing Home. No known family hx of psychiatric illness.    As per ISTOP:   "04/15/2021 2021 tramadol hcl 50 mg tablet  90 [address] [id number] Medicare [id number]   2021 tramadol hcl 50 mg tablet  60 20 [address] Knapp [id number] Medicare [id number]   2021 tramadol-acetaminophen 37.5-325 mg tab  60 20 [address] Knapp MD [id number] Insurance [id number]   2021 lorazepam 0.5 mg tablet  60 30 [address] Knapp [id number] Medicare [facility] [id number]"

## 2021-05-20 NOTE — BEHAVIORAL HEALTH ASSESSMENT NOTE - SUMMARY
89 year old F, no pph, pmh of dementia, HTN p/w R IT fracture after mechanical fall now s/p R IM Nail #pod2; Psychiatry consulted for agitation.  Pt. with several medication trials over the past few months with only Ativan appearing to be of benefit.     Recommendations:   1) Level of Observation: as per Primary Team  2) d/c Xanax  3) Can restart Ativan at 0.25mg qHS, would avoid daytime dosing of benzodiazepines  4) Haldol 0.5mg q8hr PO/IV prn agitation; monitor QTc  5) C/w Lexapro 20mg qdaily   5) Psychiatry will follow

## 2021-05-20 NOTE — OCCUPATIONAL THERAPY INITIAL EVALUATION ADULT - ADDITIONAL COMMENTS
As per care coordination assessment: Pt was transferred to [facility] from an Assisted Living facility.  Daughter stated that pt was very new to that said facility (3 hours stay).  Previous to the Assisted living, pt lives alone in [place], [place].  Independent on ADLs and with private hire helpers on weekends.  Pt uses a cane.

## 2021-05-20 NOTE — BEHAVIORAL HEALTH ASSESSMENT NOTE - NSBHCHARTREVIEWIMAGING_PSY_A_CORE FT
PROCEDURE DATE:  05/17/2021          INTERPRETATION:  HEAD CT WITHOUT CONTRAST dated 5/17/2021 9:37 PM    HISTORY: Fall.    TECHNIQUE: Head CT was performed without intravenous contrast. Axial, sagittal, and coronal images were obtained.    COMPARISON: None    FINDINGS:    The size and configuration of the ventricles and sulci are within normal limits for a person of this age. The gray-white matter differentiation is preserved.  Patchy periventricular lucency is noted, suggestive of microangiopathic ischemic disease. There is no hemorrhage or mass lesion. No extra-axial fluid collection, herniation, or subfalcine shift is visualized.    The calvarium is intact. The visualized paranasal sinuses are predominantly clear. There is opacification of the right mastoid air cells. The left mastoid air cells are clear. The native ocular lenses have been replaced.    IMPRESSION:    No intracranial hemorrhage or calvarial fracture.

## 2021-05-20 NOTE — OCCUPATIONAL THERAPY INITIAL EVALUATION ADULT - PLANNED THERAPY INTERVENTIONS, OT EVAL
ADL retraining/balance training/bed mobility training/cognitive, visual perceptual/motor coordination training/neuromuscular re-education/parent/caregiver training.../ROM/strengthening/transfer training

## 2021-05-20 NOTE — OCCUPATIONAL THERAPY INITIAL EVALUATION ADULT - TRANSFER SAFETY CONCERNS NOTED: SIT/STAND, REHAB EVAL
inappropriate to assess 2/2 increased lethargy requiring max cues to sustain eyes open once seated edge of bed minimum assist (75% patients effort)

## 2021-05-20 NOTE — PROGRESS NOTE ADULT - SUBJECTIVE AND OBJECTIVE BOX
OVERNIGHT EVENTS: Placed on enhanced sitter given agitation overnight. psych consulted.     SUBJECTIVE / INTERVAL HPI: Patient seen and examined at bedside. Sleeping, arousable but keeps eyes closed. Unable to obtain further history.     VITAL SIGNS:  Vital Signs Last 24 Hrs  T(C): 36.2 (20 May 2021 09:40), Max: 36.3 (20 May 2021 00:04)  T(F): 97.1 (20 May 2021 09:40), Max: 97.4 (20 May 2021 00:04)  HR: 86 (20 May 2021 10:45) (76 - 107)  BP: 148/80 (20 May 2021 11:28) (130/99 - 200/90)  BP(mean): --  RR: 20 (20 May 2021 10:45) (18 - 20)  SpO2: 96% (20 May 2021 10:45) (83% - 100%)    PHYSICAL EXAM:  GENERAL: frail, elderly, demented  HEAD:  Atraumatic, Normocephalic  EYES: EOMI, PERRLA,   ENMT: No tonsillar erythema, exudates, or enlargement; MMM  NECK: Supple, No JVD  CHEST/LUNG: Clear to percussion bilaterally; No rales, rhonchi, wheezing, or rubs  HEART: Regular rate and rhythm; No murmurs, rubs, or gallops  ABDOMEN: Soft, Nontender, Nondistended;   EXTREMITIES: dressing c/d/i, abrasions b/l legs  MEDICATIONS:  MEDICATIONS  (STANDING):  acetaminophen   Tablet .. 975 milliGRAM(s) Oral every 8 hours  aspirin 325 milliGRAM(s) Oral two times a day  escitalopram 20 milliGRAM(s) Oral daily  folic acid 1 milliGRAM(s) Oral daily  lactated ringers. 1000 milliLiter(s) (100 mL/Hr) IV Continuous <Continuous>  LORazepam     Tablet 0.25 milliGRAM(s) Oral at bedtime  multivitamin/minerals 1 Tablet(s) Oral daily  pantoprazole    Tablet 40 milliGRAM(s) Oral before breakfast  senna 2 Tablet(s) Oral at bedtime  simvastatin 20 milliGRAM(s) Oral at bedtime    MEDICATIONS  (PRN):  haloperidol     Tablet 0.5 milliGRAM(s) Oral every 8 hours PRN agitation  magnesium hydroxide Suspension 30 milliLiter(s) Oral daily PRN Constipation  ondansetron Injectable 4 milliGRAM(s) IV Push every 4 hours PRN Nausea and/or Vomiting  traMADol 25 milliGRAM(s) Oral every 4 hours PRN Moderate Pain (4 - 6)  traMADol 50 milliGRAM(s) Oral every 4 hours PRN Severe Pain (7 - 10)      ALLERGIES:  Allergies    No Known Allergies    Intolerances        LABS:                        10.2   16.15 )-----------( 342      ( 20 May 2021 07:08 )             31.9     05-20    142  |  104  |  10  ----------------------------<  110<H>  3.1<L>   |  26  |  0.58    Ca    9.5      20 May 2021 07:08  Mg     1.8     05-20          CAPILLARY BLOOD GLUCOSE          RADIOLOGY & ADDITIONAL TESTS: Reviewed.    ASSESSMENT:    PLAN:

## 2021-05-20 NOTE — OCCUPATIONAL THERAPY INITIAL EVALUATION ADULT - PERTINENT HX OF CURRENT PROBLEM, REHAB EVAL
89yFemale s/p R IM Nail by Dr. Peng on 5/18. Pre-op hx dementia, HTN and HLD BIBEMS s/p unwitnessed fall with R hip pain. Felt immediate pain and unable to bear weight. No head trauma or LOC. No other injuries.

## 2021-05-20 NOTE — SWALLOW BEDSIDE ASSESSMENT ADULT - NS SPL SWALLOW CLINIC TRIAL FT
Oral stage is significant for inefficient bolus stripping as well as refusal to accept bolus. Bolus manipulation and formation was prolonged. Pharyngeal swallow judged to be adequate with no overt signs of airway protection deficits at this time. Continue current diet, consider small frequent meals and supplements per RD recommendations given poor PO intake in setting of advanced Dementia. No further f/u is warranted from this service.

## 2021-05-20 NOTE — SWALLOW BEDSIDE ASSESSMENT ADULT - COMMENTS
Pt arousable to name, prefers to keep eyes closed. Pt is restless, refusing PO, yet when offered choices, accepts.

## 2021-05-20 NOTE — BEHAVIORAL HEALTH ASSESSMENT NOTE - NSBHCHARTREVIEWVS_PSY_A_CORE FT
Vital Signs Last 24 Hrs  T(C): 36.2 (20 May 2021 09:40), Max: 36.3 (20 May 2021 00:04)  T(F): 97.1 (20 May 2021 09:40), Max: 97.4 (20 May 2021 00:04)  HR: 80 (20 May 2021 09:40) (79 - 107)  BP: 138/78 (20 May 2021 09:40) (130/99 - 166/72)  BP(mean): --  RR: 18 (20 May 2021 09:40) (18 - 18)  SpO2: 100% (20 May 2021 09:40) (83% - 100%)

## 2021-05-20 NOTE — CHART NOTE - NSCHARTNOTEFT_GEN_A_CORE
Admitting Diagnosis:   Patient is a 89y old  Female who presents with a chief complaint of R IT fx (20 May 2021 06:13)    PAST MEDICAL & SURGICAL HISTORY:  Dementia  HTN (hypertension)  Anxiety  Depression    Current Nutrition Order:   Diet, Mechanical Soft (05-19-21 @ 16:40)    PO Intake: Good (%) [   ]  Fair (50-75%) [   ] Poor (<25%) [ x  ]- Please see subjective    GI Issues:   WDL, last BM 5/18  No reported n/v/d/c  No abd distention or discomfort noted  SLP: [facility] soft diet with thins    Pain:  2/10 right hip pain noted- currently on pain regime    Skin Integrity:  Surgical incision, harshad score 12  Ecchymotic  No edema present  No pressure ulcers noted    Labs:   05-20    142  |  104  |  10  ----------------------------<  110<H>  3.1<L>   |  26  |  0.58    Ca    9.5      20 May 2021 07:08  Mg     1.8     05-20    Medications:  MEDICATIONS  (STANDING):  acetaminophen   Tablet .. 975 milliGRAM(s) Oral every 8 hours  aspirin 325 milliGRAM(s) Oral two times a day  escitalopram 20 milliGRAM(s) Oral daily  folic acid 1 milliGRAM(s) Oral daily  lactated ringers. 1000 milliLiter(s) (100 mL/Hr) IV Continuous <Continuous>  LORazepam     Tablet 0.25 milliGRAM(s) Oral at bedtime  multivitamin/minerals 1 Tablet(s) Oral daily  pantoprazole    Tablet 40 milliGRAM(s) Oral before breakfast  senna 2 Tablet(s) Oral at bedtime  simvastatin 20 milliGRAM(s) Oral at bedtime    MEDICATIONS  (PRN):  haloperidol     Tablet 0.5 milliGRAM(s) Oral every 8 hours PRN agitation  magnesium hydroxide Suspension 30 milliLiter(s) Oral daily PRN Constipation  ondansetron Injectable 4 milliGRAM(s) IV Push every 4 hours PRN Nausea and/or Vomiting  traMADol 25 milliGRAM(s) Oral every 4 hours PRN Moderate Pain (4 - 6)  traMADol 50 milliGRAM(s) Oral every 4 hours PRN Severe Pain (7 - 10)    Admitting Anthropometrics:  · Height for BMI (FEET)	5 Feet  · Height for BMI (INCHES)	2.4 Inch(s)  · Height for BMI (CENTIMETERS)	158.49 Centimeter(s)  · Weight for BMI (lbs)	104.9 lb  · Weight for BMI (kg)	47.6 kg  · Body Mass Index	19  · Ideal Body Weight (lbs)	110  · Ideal Body Weight (kg)	49.9    Weight Change: Pt daughter reporting UBW was 90lbs (unknown date) now admitting at 105lbs. Suspect admit weight is incorrect as pt appears thinner. Please obtain new weight when feasible and trend weekly.     Nutrition Focused Physical Exam: Completed [ x 5/20  ]  Not Pertinent [   ]  Muscle Wasting- Temporal [ x- Moderate  ]  Clavicle/Pectoral [ x-Severe  ]  Shoulder/Deltoid [ X-Moderate  ]  Scapula [   ]  Interosseous [ x-Moderate  ]  Quadriceps [ x-Moderate  ]  Gastrocnemius [   ]  Fat Wasting- Orbital [   ]  Buccal [   ]  Triceps [ x-Moderate  ]  Rib [ x-Moderate  ]  Suspect Severe [PCM] 2/2 to physical assessment, meeting <50% est needs; please see malnutrition chart note.    Estimated energy needs:   ABW used to calculate energy needs due to pt's current body weight within % IBW (91%). Estimated based on needs for older adults, and hypermetabolic state 2/2 s/p surgery, and suspected malnutrition  Kcal (25-30 kcal/kg): 9606-4318 kcal  Protein (1.2-1.5 g/kg pro): 57-71g pro  Fluids (30-35 ml/kg): 1896-2977 ml    Subjective:   89F hx dementia, HTN and HLD, BIBEMS s/p unwitnessed fall with R hip pain. Plan for R IMN 5/18. Pt sundowns and becomes more agitated at night; placed on 1:1 supervision. S/p SLP eval 5/20 with recs for mech soft diet with thin liquids.     On assessment, pt resting in bed to lethargic to participate. Currently on 1:1 supervision with PCA at bedside able to provide answers. Currently on Mech soft diet with thin liquids per SLP eval. Pt has yet to have any PO today, unable to note PO intake from 5/19. GI WDL, last BM 5/18. No reported n/v/d/c. Education deferred at this time. NFPE was remarkable for moderate to severe muscle and fat wasting. Per ASPEN guidelines, pt meets criteria for severe malnutrition- team made aware. Recommend addition of Ensure Enlive BID (700 kcal, 40g protein, 360 mL free H2O) to optimize nutr inktake. Cont with supplementation of MVI. Labs: K 3.1L, GLu 110H. Cont to monitor lytes and replete prn. Please see recs below. RD to follow.     Previous Nutrition Diagnosis: Increased Nutrient Needs RT hypermetabolic state 2/2 s/p surgery AEB calculated increased EER.     Active [ x  ]  Resolved [   ]    If resolved, new PES:     Goal/Expected Outcome once diet advanced, observe >75% EER met via PO intake with good tolerance.    Recommendations:  1. Cont with mech soft diet with thin liquids  >> Cont to adjust diet consistency per SLP recs  >> Recommend addition of Ensure Enlive BID (700 kcal, 40g protein, 360 mL free H2O)  >> Cont with MVI supplementation  2. Pain and bowel regime per team   3. Cont to monitor lytes and replete prn  4. RD diet edu prn    Education: Deferred    Risk Level: High [ x  ] Moderate [   ] Low [   ]

## 2021-05-20 NOTE — PROGRESS NOTE ADULT - SUBJECTIVE AND OBJECTIVE BOX
Ortho Note    Pt comfortable without complaints, pain controlled, daughter at bedside  Denies CP, SOB, N/V, numbness/tingling     Vital Signs Last 24 Hrs  T(C): --  T(F): --  HR: 75 (05-20-21 @ 14:20) (75 - 86)  BP: 126/75 (05-20-21 @ 14:20) (126/75 - 200/90)  BP(mean): --  RR: 20 (05-20-21 @ 10:45) (18 - 20)  SpO2: 96% (05-20-21 @ 10:45) (96% - 96%)  I&O's Summary    19 May 2021 07:01  -  20 May 2021 07:00  --------------------------------------------------------  IN: 2220 mL / OUT: 1050 mL / NET: 1170 mL        General: Pt Alert and oriented, NAD  DSG C/D/I right hip  Pulses intact RLE  Sensation intact RLE  Motor: EHL/FHL/TA/GS 5/5 RLE                          10.2   16.15 )-----------( 342      ( 20 May 2021 07:08 )             31.9     05-20    142  |  104  |  10  ----------------------------<  110<H>  3.1<L>   |  26  |  0.58    Ca    9.5      20 May 2021 07:08  Mg     1.8     05-20        A/P: 89yFemale POD#2 s/p Right hip IM Nail  - Stable  - Pain Control  - DVT ppx: asa  - PT, WBS: wbat  - rehab planning  - Daughter states patient has received both doses of Pfizer covid-19 vaccine.    Ortho Pager [phone]

## 2021-05-20 NOTE — OCCUPATIONAL THERAPY INITIAL EVALUATION ADULT - LEVEL OF CONSCIOUSNESS, OT EVAL
requires max cues to sustain attention and keep eyes open 2/2 increased lethargy/lethargic/somnolent

## 2021-05-21 ENCOUNTER — TRANSCRIPTION ENCOUNTER (OUTPATIENT)
Age: 86
End: 2021-05-21

## 2021-05-21 VITALS
OXYGEN SATURATION: 97 % | SYSTOLIC BLOOD PRESSURE: 151 MMHG | RESPIRATION RATE: 18 BRPM | DIASTOLIC BLOOD PRESSURE: 83 MMHG | TEMPERATURE: 97 F | HEART RATE: 86 BPM

## 2021-05-21 LAB
ANION GAP SERPL CALC-SCNC: 9 MMOL/L — SIGNIFICANT CHANGE UP (ref 5–17)
BUN SERPL-MCNC: 12 MG/DL — SIGNIFICANT CHANGE UP (ref 7–23)
CALCIUM SERPL-MCNC: 8.9 MG/DL — SIGNIFICANT CHANGE UP (ref 8.4–10.5)
CHLORIDE SERPL-SCNC: 106 MMOL/L — SIGNIFICANT CHANGE UP (ref 96–108)
CO2 SERPL-SCNC: 26 MMOL/L — SIGNIFICANT CHANGE UP (ref 22–31)
CREAT SERPL-MCNC: 0.55 MG/DL — SIGNIFICANT CHANGE UP (ref 0.5–1.3)
GLUCOSE SERPL-MCNC: 98 MG/DL — SIGNIFICANT CHANGE UP (ref 70–99)
HCT VFR BLD CALC: 29.6 % — LOW (ref 34.5–45)
HGB BLD-MCNC: 9.6 G/DL — LOW (ref 11.5–15.5)
MCHC RBC-ENTMCNC: 30.6 PG — SIGNIFICANT CHANGE UP (ref 27–34)
MCHC RBC-ENTMCNC: 32.4 GM/DL — SIGNIFICANT CHANGE UP (ref 32–36)
MCV RBC AUTO: 94.3 FL — SIGNIFICANT CHANGE UP (ref 80–100)
NRBC # BLD: 0 /100 WBCS — SIGNIFICANT CHANGE UP (ref 0–0)
PLATELET # BLD AUTO: 319 K/UL — SIGNIFICANT CHANGE UP (ref 150–400)
POTASSIUM SERPL-MCNC: 3.6 MMOL/L — SIGNIFICANT CHANGE UP (ref 3.5–5.3)
POTASSIUM SERPL-SCNC: 3.6 MMOL/L — SIGNIFICANT CHANGE UP (ref 3.5–5.3)
RBC # BLD: 3.14 M/UL — LOW (ref 3.8–5.2)
RBC # FLD: 13.6 % — SIGNIFICANT CHANGE UP (ref 10.3–14.5)
SODIUM SERPL-SCNC: 141 MMOL/L — SIGNIFICANT CHANGE UP (ref 135–145)
WBC # BLD: 11.05 K/UL — HIGH (ref 3.8–10.5)
WBC # FLD AUTO: 11.05 K/UL — HIGH (ref 3.8–10.5)

## 2021-05-21 PROCEDURE — 76000 FLUOROSCOPY <1 HR PHYS/QHP: CPT

## 2021-05-21 PROCEDURE — U0003: CPT

## 2021-05-21 PROCEDURE — 72125 CT NECK SPINE W/O DYE: CPT

## 2021-05-21 PROCEDURE — 82962 GLUCOSE BLOOD TEST: CPT

## 2021-05-21 PROCEDURE — 96375 TX/PRO/DX INJ NEW DRUG ADDON: CPT

## 2021-05-21 PROCEDURE — 80053 COMPREHEN METABOLIC PANEL: CPT

## 2021-05-21 PROCEDURE — 92610 EVALUATE SWALLOWING FUNCTION: CPT

## 2021-05-21 PROCEDURE — 99285 EMERGENCY DEPT VISIT HI MDM: CPT

## 2021-05-21 PROCEDURE — 87186 SC STD MICRODIL/AGAR DIL: CPT

## 2021-05-21 PROCEDURE — C1889: CPT

## 2021-05-21 PROCEDURE — 85027 COMPLETE CBC AUTOMATED: CPT

## 2021-05-21 PROCEDURE — 80048 BASIC METABOLIC PNL TOTAL CA: CPT

## 2021-05-21 PROCEDURE — 97161 PT EVAL LOW COMPLEX 20 MIN: CPT

## 2021-05-21 PROCEDURE — 87086 URINE CULTURE/COLONY COUNT: CPT

## 2021-05-21 PROCEDURE — 86900 BLOOD TYPING SEROLOGIC ABO: CPT

## 2021-05-21 PROCEDURE — U0005: CPT

## 2021-05-21 PROCEDURE — 84484 ASSAY OF TROPONIN QUANT: CPT

## 2021-05-21 PROCEDURE — 99232 SBSQ HOSP IP/OBS MODERATE 35: CPT

## 2021-05-21 PROCEDURE — 83735 ASSAY OF MAGNESIUM: CPT

## 2021-05-21 PROCEDURE — C1713: CPT

## 2021-05-21 PROCEDURE — 81001 URINALYSIS AUTO W/SCOPE: CPT

## 2021-05-21 PROCEDURE — 36415 COLL VENOUS BLD VENIPUNCTURE: CPT

## 2021-05-21 PROCEDURE — 86901 BLOOD TYPING SEROLOGIC RH(D): CPT

## 2021-05-21 PROCEDURE — 85730 THROMBOPLASTIN TIME PARTIAL: CPT

## 2021-05-21 PROCEDURE — 71045 X-RAY EXAM CHEST 1 VIEW: CPT

## 2021-05-21 PROCEDURE — 73502 X-RAY EXAM HIP UNI 2-3 VIEWS: CPT

## 2021-05-21 PROCEDURE — 73552 X-RAY EXAM OF FEMUR 2/>: CPT

## 2021-05-21 PROCEDURE — 96374 THER/PROPH/DIAG INJ IV PUSH: CPT

## 2021-05-21 PROCEDURE — C1776: CPT

## 2021-05-21 PROCEDURE — 85610 PROTHROMBIN TIME: CPT

## 2021-05-21 PROCEDURE — 97116 GAIT TRAINING THERAPY: CPT

## 2021-05-21 PROCEDURE — 87181 SC STD AGAR DILUTION PER AGT: CPT

## 2021-05-21 PROCEDURE — 86850 RBC ANTIBODY SCREEN: CPT

## 2021-05-21 PROCEDURE — 85025 COMPLETE CBC W/AUTO DIFF WBC: CPT

## 2021-05-21 PROCEDURE — 70450 CT HEAD/BRAIN W/O DYE: CPT

## 2021-05-21 RX ORDER — HALOPERIDOL DECANOATE 100 MG/ML
1 INJECTION INTRAMUSCULAR
Qty: 0 | Refills: 0 | DISCHARGE
Start: 2021-05-21

## 2021-05-21 RX ORDER — CHOLECALCIFEROL (VITAMIN D3) 125 MCG
0 CAPSULE ORAL
Qty: 0 | Refills: 0 | DISCHARGE

## 2021-05-21 RX ORDER — TRAMADOL HYDROCHLORIDE 50 MG/1
0.5 TABLET ORAL
Qty: 0 | Refills: 0 | DISCHARGE
Start: 2021-05-21

## 2021-05-21 RX ORDER — ASPIRIN/CALCIUM CARB/MAGNESIUM 324 MG
1 TABLET ORAL
Qty: 0 | Refills: 0 | DISCHARGE
Start: 2021-05-21

## 2021-05-21 RX ORDER — PANTOPRAZOLE SODIUM 20 MG/1
1 TABLET, DELAYED RELEASE ORAL
Qty: 0 | Refills: 0 | DISCHARGE
Start: 2021-05-21

## 2021-05-21 RX ORDER — SENNA PLUS 8.6 MG/1
2 TABLET ORAL
Qty: 0 | Refills: 0 | DISCHARGE
Start: 2021-05-21

## 2021-05-21 RX ORDER — ACETAMINOPHEN 500 MG
3 TABLET ORAL
Qty: 0 | Refills: 0 | DISCHARGE
Start: 2021-05-21

## 2021-05-21 RX ORDER — MULTIVIT-MIN/FERROUS GLUCONATE 9 MG/15 ML
1 LIQUID (ML) ORAL
Qty: 0 | Refills: 0 | DISCHARGE
Start: 2021-05-21

## 2021-05-21 RX ORDER — TRAMADOL HYDROCHLORIDE 50 MG/1
1 TABLET ORAL
Qty: 0 | Refills: 0 | DISCHARGE
Start: 2021-05-21

## 2021-05-21 RX ADMIN — Medication 975 MILLIGRAM(S): at 06:45

## 2021-05-21 RX ADMIN — Medication 1 TABLET(S): at 12:39

## 2021-05-21 RX ADMIN — ESCITALOPRAM OXALATE 20 MILLIGRAM(S): 10 TABLET, FILM COATED ORAL at 12:39

## 2021-05-21 RX ADMIN — Medication 1 MILLIGRAM(S): at 12:39

## 2021-05-21 RX ADMIN — Medication 975 MILLIGRAM(S): at 05:48

## 2021-05-21 RX ADMIN — PANTOPRAZOLE SODIUM 40 MILLIGRAM(S): 20 TABLET, DELAYED RELEASE ORAL at 05:48

## 2021-05-21 RX ADMIN — Medication 325 MILLIGRAM(S): at 05:48

## 2021-05-21 RX ADMIN — Medication 0.25 MILLIGRAM(S): at 14:12

## 2021-05-21 NOTE — PROGRESS NOTE ADULT - NUTRITIONAL ASSESSMENT
This patient has been assessed with a concern for Malnutrition and has been determined to have a diagnosis/diagnoses of Severe protein-calorie malnutrition and Underweight (BMI < 19).    This patient is being managed with:   Diet Mechanical Soft-  Supplement Feeding Modality:  Oral  Ensure Enlive Cans or Servings Per Day:  1       Frequency:  Two Times a day  Entered: May 20 2021 12:31PM    
This patient has been assessed with a concern for Malnutrition and has been determined to have a diagnosis/diagnoses of Severe protein-calorie malnutrition and Underweight (BMI < 19).    This patient is being managed with:   Diet Mechanical Soft-  Supplement Feeding Modality:  Oral  Ensure Enlive Cans or Servings Per Day:  1       Frequency:  Two Times a day  Entered: May 20 2021 12:31PM

## 2021-05-21 NOTE — PROGRESS NOTE ADULT - TIME BILLING
Hospital medicine
Counseling provided with time spent for DVT prophylaxis, anemia, and medication education.  Time spent discussing care with primary team. Greater than 30 minutes spent on total encounter; more than 50% of the visit was spent counseling and/or coordinating care by the attending physician.

## 2021-05-21 NOTE — PROGRESS NOTE ADULT - ASSESSMENT
89 year old F w/ PMH of dementia, HTN p/w R IT fracture after mechanical fall now s/p R IM Nail #pod1.    #Post operative state: pain control, bowel regimen, c/w IS, f/u S/S recs  #Q waves on EKG: should have outpatient cardiac w/u  #Anemia: acute blood loss 2/2 fracture/surgery, trend H/H, stable  #Hypokalemia: replete  #Agitation; f/u psych recs  #HTN: likely 2/2 pain from fracture, c/w pain control  #Leukocytosis: likely reactive and possibly UTI, can d/c ceftriaxone, WBC downtrending  #UTI: s/p ceftriaxone course  #Dispo: TIFFANY
89 year old F w/ PMH of dementia, HTN p/w R IT fracture after mechanical fall now s/p R IM Nail #pod1.    #Post operative state: pain control, bowel regimen, pt, is  #Q waves on EKG: should have outpatient cardiac w/u  #HTN: likely 2/2 pain from fracture, c/w pain control  #Leukocytosis: likely reactive but also may be 2/2 UTI - continue IV ceftriaxone   #UTI: f/u urine culture, c/w ceftriaxone for 3 day course,   #R hip fracture: care per ortho, c/w pain control, avoid opiates  #Dispo: pending PT eval
89 year old F w/ PMH of dementia, HTN p/w R IT fracture after mechanical fall now s/p R IM Nail #pod1.    #Post operative state: pain control, bowel regimen, c/w IS,  #Q waves on EKG: outpatient cardiac w/u  #Anemia: acute blood loss 2/2 fracture/surgery, trend H/H, stable  #Hypokalemia: replete  #Agitation; f/u psych recs  #HTN: likely 2/2 pain from fracture, c/w pain control  #Leukocytosis: likely reactive and possibly UTI, s/p ceftriaxone course  #UTI: s/p ceftriaxone course  #Dispo: TIFFANY today

## 2021-05-21 NOTE — DISCHARGE NOTE PROVIDER - NSDCCPTREATMENT_GEN_ALL_CORE_FT
PRINCIPAL PROCEDURE  Procedure: Closed intramedullary rodding, fracture, femur  Findings and Treatment:

## 2021-05-21 NOTE — DISCHARGE NOTE NURSING/CASE MANAGEMENT/SOCIAL WORK - PATIENT PORTAL LINK FT
You can access the FollowMyHealth Patient Portal offered by [facility] by registering at the following website: [email or web address]. By joining [employer]’s FollowMyHealth portal, you will also be able to view your health information using other applications (apps) compatible with our system.

## 2021-05-21 NOTE — DISCHARGE NOTE PROVIDER - CARE PROVIDER_API CALL
Satnam Peng)  Orthopaedic Surgery  [address] [place] [address]  Phone: [phone]  Fax: [phone]  Follow Up Time:    Satnam Peng)  Orthopaedic Surgery  [address] [place] [address]  Phone: [phone]  Fax: [phone]  Follow Up Time: 2 weeks

## 2021-05-21 NOTE — PROGRESS NOTE ADULT - NSICDXPILOT_GEN_ALL_CORE
[place]

## 2021-05-21 NOTE — PROGRESS NOTE ADULT - SUBJECTIVE AND OBJECTIVE BOX
Ortho Progress Note    Subjective:  Pt comfortable this AM, not agitated  Denies CP, SOB, N/V, numbness/tingling.    Objective:  Vital Signs Last 24 Hrs  T(C): 36.4 (21 May 2021 05:47), Max: 36.7 (20 May 2021 20:56)  T(F): 97.6 (21 May 2021 05:47), Max: 98 (20 May 2021 20:56)  HR: 92 (21 May 2021 05:47) (69 - 92)  BP: 141/82 (21 May 2021 05:47) (126/75 - 200/90)  BP(mean): --  RR: 18 (21 May 2021 05:47) (18 - 20)  SpO2: 97% (21 May 2021 05:47) (96% - 100%)    Physical Exam:  General: Pt Alert and oriented, NAD  RLE:  Dressing C/D/I  Pulses: DP/PT palpable  Sensation: SILT throughout  Motor: 5/5 EHL/FHL/TA/GS    A/P: 89yFemale s/p R IM Nail by Dr. Peng on 5/18. Ceftriaxone 1g q24h for UTI dc'ed 5/20.  - Stable  - Pain Control  - DVT ppx: SCDs, ASA 325mg bid  - PT, WBS: WBAT  - DIspo: TIFFANY today or tomorrow likely    Ortho Pager [phone]

## 2021-05-21 NOTE — PROGRESS NOTE ADULT - SUBJECTIVE AND OBJECTIVE BOX
Ortho Note    Pt comfortable without complaints, pain controlled, just had BM on bedside commode, assisted back to bed.  Denies CP, SOB, N/V, numbness/tingling     Vital Signs Last 24 Hrs  T(C): 36.3 (05-21-21 @ 08:35), Max: 36.4 (05-21-21 @ 05:47)  T(F): 97.4 (05-21-21 @ 08:35), Max: 97.6 (05-21-21 @ 05:47)  HR: 88 (05-21-21 @ 08:35) (88 - 92)  BP: 134/93 (05-21-21 @ 08:35) (134/93 - 141/82)  BP(mean): --  RR: 18 (05-21-21 @ 08:35) (18 - 18)  SpO2: 98% (05-21-21 @ 08:35) (97% - 98%)  I&O's Summary    20 May 2021 07:01  -  21 May 2021 07:00  --------------------------------------------------------  IN: 0 mL / OUT: 1400 mL / NET: -1400 mL    21 May 2021 07:01  -  21 May 2021 10:52  --------------------------------------------------------  IN: 120 mL / OUT: 200 mL / NET: -80 mL        General: Pt Alert and oriented x 1 , NAD  DSG C/D/I right hip  Pulses intact RLE  Sensation intact RLE  Motor: EHL/FHL/TA/GS 5/5 RLE                          9.6    11.05 )-----------( 319      ( 21 May 2021 07:37 )             29.6     05-21    141  |  106  |  12  ----------------------------<  98  3.6   |  26  |  0.55    Ca    8.9      21 May 2021 07:37  Mg     1.8     05-20        A/P: 89yFemale POD#3 s/p Right hip IM Nail  - Stable  - Pain Control  - DVT ppx: scds  - PT, WBS: wbat  - rehab planning    Ortho Pager [phone]

## 2021-05-21 NOTE — DISCHARGE NOTE PROVIDER - HOSPITAL COURSE
Admitted  Surgery - right hip intramedullary nailing  Mary-op Antibiotics  Pain control  DVT prophylaxis  OOB/Physical Therapy Admitted to Orthopedics for Right intertrochanteric femur fracture   Attending: Dr. Peng   Surgery - right hip intramedullary nailing 5/18  Mary-op Antibiotics  Pain control  DVT prophylaxis  OOB/Physical Therapy/occupational therapy   Medicine Consult   Behavioral Health consult  Speech pathology consult

## 2021-05-21 NOTE — DISCHARGE NOTE PROVIDER - NSDCMRMEDTOKEN_GEN_ALL_CORE_FT
Lexapro 20 mg oral tablet: 1 tab(s) orally once a day  simvastatin 20 mg oral tablet: 1 tab(s) orally once a day (at bedtime)  Vitamin D3 50,000 intl units (1250 mcg) oral capsule: orally once a week   acetaminophen 325 mg oral tablet: 3 tab(s) orally every 8 hours  aspirin 325 mg oral tablet: 1 tab(s) orally 2 times a day  haloperidol 0.5 mg oral tablet: 1 tab(s) orally every 8 hours, As needed, agitation  Lexapro 20 mg oral tablet: 1 tab(s) orally once a day  LORazepam: 0.25 milligram(s) orally 2 times a day as needed for anxiety   Multiple Vitamins with Minerals oral tablet: 1 tab(s) orally once a day  pantoprazole 40 mg oral delayed release tablet: 1 tab(s) orally once a day (before a meal)  senna oral tablet: 2 tab(s) orally once a day (at bedtime)  simvastatin 20 mg oral tablet: 1 tab(s) orally once a day (at bedtime)  traMADol 50 mg oral tablet: 0.5 tab(s) orally every 4 hours, As needed, Moderate Pain (4 - 6)  traMADol 50 mg oral tablet: 1 tab(s) orally every 4 hours, As needed, Severe Pain (7 - 10)

## 2021-05-21 NOTE — PROGRESS NOTE ADULT - PROVIDER SPECIALTY LIST ADULT
Hospitalist
Orthopedics
Hospitalist
Orthopedics
Orthopedics
Hospitalist

## 2021-05-21 NOTE — DISCHARGE NOTE PROVIDER - NSDCFUADDINST_GEN_ALL_CORE_FT
Weight bear as tolerated with assistive device.  No strenuous activity, heavy lifting, driving or returning to work until cleared by MD.  You may shower - dressing is water-resistant, no soaking in bathtubs.  Remove dressing after post op day 5-7, then leave incision open to air. Keep incision clean and dry.  Try to have regular bowel movements, take stool softener or laxative if necessary.  May take Pepcid or Prilosec for upset stomach.  May take Aleve or Naproxen if needed.  Swelling may travel all the way down leg to foot, this is normal and will subside in a few weeks.  Call to schedule an appt with Dr. Peng for follow up, if you have staples or sutures they will be removed in office.  Contact your doctor if you experience: fever greater than 101.5, chills, chest pain, difficulty breathing, redness or excessive drainage around the incision, other concerns.  Follow up with your primary care provider.   Weight bear as tolerated with assistive device.  No strenuous activity, heavy lifting.   You may shower - dressing is water-resistant, no soaking in bathtubs.  Keep staples covered with dry gauze and tape until removed.   During your hospital admission you sustained a small skin tear to your left forearm. Keep this covered with gauze/kerlix, ok to apply bacitracin.   Try to have regular bowel movements, take stool softener or laxative if necessary.  May take Pepcid or Prilosec for upset stomach.  Swelling may travel all the way down leg to foot, this is normal and will subside in a few weeks.  Call to schedule an appt with Dr. Peng for follow up, if you have staples or sutures they will be removed in office.  Contact your doctor if you experience: fever greater than 101.5, chills, chest pain, difficulty breathing, redness or excessive drainage around the incision, other concerns.  Follow up with your primary care provider.

## 2021-05-21 NOTE — DISCHARGE NOTE PROVIDER - NSDCCPCAREPLAN_GEN_ALL_CORE_FT
PRINCIPAL DISCHARGE DIAGNOSIS  Diagnosis: Hip fracture, right  Assessment and Plan of Treatment:       SECONDARY DISCHARGE DIAGNOSES  Diagnosis: UTI (urinary tract infection)  Assessment and Plan of Treatment:

## 2021-05-21 NOTE — DISCHARGE NOTE PROVIDER - NSDCQMSTROKE_NEU_ALL_CORE
PIV removed.  Tip intact.  Pt tolerated well.  Family at bedside and VN going over discharge instructions.  Family will take pt home once discharge instructions are completely given.     No

## 2021-05-21 NOTE — DISCHARGE NOTE PROVIDER - NSDCACTIVITY_GEN_ALL_CORE
Do not drive or operate machinery/No heavy lifting/straining Do not drive or operate machinery/Showering allowed/Walking - Indoors allowed/No heavy lifting/straining/Walking - Outdoors allowed

## 2021-05-21 NOTE — PROGRESS NOTE ADULT - SUBJECTIVE AND OBJECTIVE BOX
OVERNIGHT EVENTS:    SUBJECTIVE / INTERVAL HPI: Patient seen and examined at bedside. Asking where she is and asking for her daughter. Denies pain.      VITAL SIGNS:  Vital Signs Last 24 Hrs  T(C): 36.3 (21 May 2021 08:35), Max: 36.7 (20 May 2021 20:56)  T(F): 97.4 (21 May 2021 08:35), Max: 98 (20 May 2021 20:56)  HR: 88 (21 May 2021 08:35) (69 - 92)  BP: 134/93 (21 May 2021 08:35) (134/93 - 166/73)  BP(mean): --  RR: 18 (21 May 2021 08:35) (18 - 20)  SpO2: 98% (21 May 2021 08:35) (96% - 98%)    PHYSICAL EXAM:  GENERAL: frail, elderly, demented  HEAD:  Atraumatic, Normocephalic  EYES: EOMI, PERRLA,   ENMT: No tonsillar erythema, exudates, or enlargement; MMM  NECK: Supple, No JVD  CHEST/LUNG: Clear to percussion bilaterally; No rales, rhonchi, wheezing, or rubs  HEART: Regular rate and rhythm; No murmurs, rubs, or gallops  ABDOMEN: Soft, Nontender, Nondistended;   EXTREMITIES: dressing c/d/i, abrasions b/l legs    MEDICATIONS:  MEDICATIONS  (STANDING):  acetaminophen   Tablet .. 975 milliGRAM(s) Oral every 8 hours  aspirin 325 milliGRAM(s) Oral two times a day  escitalopram 20 milliGRAM(s) Oral daily  folic acid 1 milliGRAM(s) Oral daily  lactated ringers. 1000 milliLiter(s) (100 mL/Hr) IV Continuous <Continuous>  LORazepam     Tablet 0.25 milliGRAM(s) Oral at bedtime  multivitamin/minerals 1 Tablet(s) Oral daily  pantoprazole    Tablet 40 milliGRAM(s) Oral before breakfast  senna 2 Tablet(s) Oral at bedtime  simvastatin 20 milliGRAM(s) Oral at bedtime    MEDICATIONS  (PRN):  haloperidol     Tablet 0.5 milliGRAM(s) Oral every 8 hours PRN agitation  magnesium hydroxide Suspension 30 milliLiter(s) Oral daily PRN Constipation  ondansetron Injectable 4 milliGRAM(s) IV Push every 4 hours PRN Nausea and/or Vomiting  traMADol 25 milliGRAM(s) Oral every 4 hours PRN Moderate Pain (4 - 6)  traMADol 50 milliGRAM(s) Oral every 4 hours PRN Severe Pain (7 - 10)      ALLERGIES:  Allergies    No Known Allergies    Intolerances        LABS:                        9.6    11.05 )-----------( 319      ( 21 May 2021 07:37 )             29.6     05-21    141  |  106  |  12  ----------------------------<  98  3.6   |  26  |  0.55    Ca    8.9      21 May 2021 07:37  Mg     1.8     05-20          CAPILLARY BLOOD GLUCOSE          RADIOLOGY & ADDITIONAL TESTS: Reviewed.    ASSESSMENT:    PLAN:

## 2021-05-27 NOTE — DISCHARGE NOTE PROVIDER - NSDCQMPCI_CARD_ALL_CORE
2494:  Patient called and left message stating she needs medical information sent from her Urologist.  She aksed for a call back to [phone]/MICHAEL Posadas RN     1125:  I called patient. Patient states she can't find the id number, in order to request medical records, from the Urologist she saw last Wed in [place].  She needs these records sent to Dr. Leija for her appt on 3/29.  Patient reports she is feeling good and trying to drink fluids.  She will be at her infusion appt tomorrow/MICHAEL Posadas RN     1155:  I called [facility] at [address] (new office address) at [phone].  I talked with Azeb in Medical Records. She will fax records to  today at [phone]/MICHAEL Posadas RN     1200:  Called Dania Somers and left message letting her that medical records being faxed to Dr. Leija today/MICHAEL Posadas RN       
No

## 2021-06-02 DIAGNOSIS — E87.6 HYPOKALEMIA: ICD-10-CM
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
## 2021-06-02 DIAGNOSIS — E78.5 HYPERLIPIDEMIA, UNSPECIFIED: ICD-10-CM

## 2021-06-02 DIAGNOSIS — F32.9 MAJOR DEPRESSIVE DISORDER, SINGLE EPISODE, UNSPECIFIED: ICD-10-CM

## 2021-06-02 DIAGNOSIS — S72.141A DISPLACED INTERTROCHANTERIC FRACTURE OF RIGHT FEMUR, INITIAL ENCOUNTER FOR CLOSED FRACTURE: ICD-10-CM
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
## 2021-06-02 DIAGNOSIS — E43 UNSPECIFIED SEVERE PROTEIN-CALORIE MALNUTRITION: ICD-10-CM

## 2021-06-02 DIAGNOSIS — D62 ACUTE POSTHEMORRHAGIC ANEMIA: ICD-10-CM

## 2021-06-02 DIAGNOSIS — F03.90 UNSPECIFIED DEMENTIA WITHOUT BEHAVIORAL DISTURBANCE: ICD-10-CM

## 2021-06-02 DIAGNOSIS — F41.9 ANXIETY DISORDER, UNSPECIFIED: ICD-10-CM

## 2021-06-02 DIAGNOSIS — N39.0 URINARY TRACT INFECTION, SITE NOT SPECIFIED: ICD-10-CM

## 2021-06-09 NOTE — ED PROVIDER NOTE - INTERNATIONAL TRAVEL
CCx:Chronic cough    HPI:Ms. Pablo is a 48 yo lady with a past medical history significant for ARDON, Leukocytoclastic vasculitis and HTN who presents to clinic today with the aforementioned chief complaint.  She states that she has been experiencing these symptoms for the last 4 weeks that she has completed a course of antibiotics and prednisone (for 10 days), with minimal alleviation of her symptoms.  She describes that initially her cough was mildly productive of green colored sputum but no associated fevers that after completing the antibiotics her sputum production is almost completely cleared up.  She endorses some wheezing and gurgling sounds of the back of her throat when she is wheezing also.  He describes that she had a similar episode last year in April which initiated with an upper respiratory tract infection and let cough that lasted 2-4 months.  At the time she underwent chest imaging including a CT scan of her chest which was unremarkable and has been evaluated by speech therapy and completed a six-month course of speech therapy in December.  Note she works at [employer] and states that she sometimes has to take up to 100 telephone calls in a day and that certainly by the end of the day her coughing is much worse.  He been on short-term disability because of inability to talk at work.  She describes that she eats her meals at least 5 hours before she lies down that she elevates the head end of her bed while sleeping.  She has a past been prescribed PPI although she had no acid reflux symptoms and states that this actually gave her heartburn.  Of note she has gained about 80 pounds over the last 5 years.    ROS:  Review of Systems - General ROS: positive for  - fatigue  Psychological ROS: negative  Ophthalmic ROS: negative  ENT ROS: positive for - nasal discharge  Allergy and Immunology ROS: negative  Hematological and Lymphatic ROS: negative  Endocrine ROS:  negative  Respiratory ROS: positive for - cough  Cardiovascular ROS: no chest pain or dyspnea on exertion  Gastrointestinal ROS: no abdominal pain, change in bowel habits, or black or bloody stools  Genito-Urinary ROS: no dysuria, trouble voiding, or hematuria  Musculoskeletal ROS: negative  Neurological ROS: no TIA or stroke symptoms  Dermatological ROS: negative    PMH:  1. ARDON  2. Leukocytoclastic vasculitis  3. Hypertension  4. Morbid obesity    PSH:  1. Bilateral knee replacements    Allergies:  Reviewed in epic    Family HX:  1. Mother: COPD, status post lung transplant now   2. Father: COPD, hypertension now   3. P. Grandmother: COPD    Social Hx:  Marital status: Single  Number of children: 0  Resides in a 45 yr old apartment, no concern for mold.  Occupational history: Works at a [employer] for [employer]   service: No  Pets: No  Smoking history: 0 pack year history  Alcohol use: 12 pack of beer per week   Recreational drug use: No  Hobbies: None  Recent Travel: No    Current Meds:  Current Outpatient Prescriptions   Medication Sig Dispense Refill     albuterol (PROVENTIL HFA;VENTOLIN HFA) 90 mcg/actuation inhaler Inhale 2 puffs every 6 (six) hours as needed for wheezing. 1 Inhaler 1     amLODIPine (NORVASC) 10 MG tablet TAKE 1 TABLET BY MOUTH EVERY DAY 90 tablet 0     benzonatate (TESSALON) 200 MG capsule Take 1 capsule (200 mg total) by mouth 3 (three) times a day as needed for cough. 60 capsule 0     spironolactone (ALDACTONE) 25 MG tablet TAKE 1 TABLET BY MOUTH EVERY DAY 90 tablet 0     losartan (COZAAR) 50 MG tablet Take 1 tablet (50 mg total) by mouth daily. 30 tablet 3     omeprazole (PRILOSEC) 20 MG capsule Take 2 capsules (40 mg total) by mouth daily. 30 capsule 2     Current Facility-Administered Medications   Medication Dose Route Frequency Provider Last Rate Last Dose     ipratropium-albuterol 0.5-2.5 mg/3 mL nebulizer solution 3 mL (DUO-NEB)  3 mL Nebulization  Once Orville Palacios MD             Labs:  Reviewed.    Imaging studies:  CXR 1/31/17:  No significant change. No focal consolidation. Mild asymmetric elevation of the right hemidiaphragm. No pleural effusion. Upper normal heart size.    PFT's 2/22/17  FEV1/FVC is 83% and is normal.  FEV1 is 2.27L (78%) predicted and is normal.  FVC is 2.72L (73%) predicted and normal.  There was no improvement in spirometry after a single inhaled dose of bronchodilator.  TLC is 4.76L (92%) predicted and is normal.  RV is 1.84L (102%) predicted and is normal.  DLCO is 22.70ml/min/hg (97%) predicted and is normal when it is corrected for hemoglobin.  Flow volume loops indicate no abnormalities.    Impression:  Full Pulmonary Function Test is normal.  PFTs are consistent with no obstructive disease.  Spirometry is not consistent with reversibility.  There is no hyperinflation.  There is no air-trapping.  Diffusion capacity when corrected for hemoglobin is normal.     Physical Exam:  Visit Vitals     /78     Pulse 84     Resp 20     Wt (!) 359 lb 8 oz (163.1 kg)     SpO2 96%  Comment: RA     BMI 59.46 kg/m2     Physical Exam   Constitutional: She is oriented to person, place, and time. She appears well-developed and well-nourished.   Morbidly obese very pleasant lady.   HENT:   Head: Normocephalic and atraumatic.   Eyes: Conjunctivae are normal. Pupils are equal, round, and reactive to light.   Neck: Normal range of motion.   Cardiovascular: Normal rate and regular rhythm.    Pulmonary/Chest: Effort normal and breath sounds normal. No respiratory distress.   Abdominal: Soft.   Musculoskeletal: Normal range of motion.   Neurological: She is alert and oriented to person, place, and time.   Skin: Skin is warm and dry.   Psychiatric: She has a normal mood and affect. Her behavior is normal. Judgment and thought content normal.         Assessment and Plan:Radha Pablo is a 49 y.o. with a past medical history significant for  ARDON, Leukocytoclastic vasculitis, morbid obesity and HTN who presents to clinic today for evaluation of a chronic cough with normal pulmonary function testing and unremarkable chest imaging studies. The likely etiology of her  symptoms includes; cough variant asthma, aspiration, postviral bronchitis and lastly ACE inhibitor induced cough. For the present we would recommend;    1. Cough: Etiology is as above.  The patient has been on lisinopril for many years and has never had a cough related to this before, however, given that this is either a confounding factor and can certainly give rise to a dry cough and recommended that we discontinue this medication for the time being.    Initiate omeprazole 40 mg a day.    I have instructed her to be sure not to eat or drink anything for at least 2 hours prior to lying down and to continue to elevate the head end of her bed while she sleeps.    We will trial a four-week course of Spiriva only because the patient states that she has had severe thrush in the past when she has trialed an inhaled steroid despite rinsing her mouth.    Continue as needed cough medication.    We will discontinue her lisinopril and switch this to losartan.  I instructed her to have a blood pressure checked at her primary care provider's office in a couple of weeks to see if she needs titration of her medications.  2. HCM: Discussed with the patient that she would benefit from weight loss and the fact that her we could certainly be precipitating silent GERD.  3. Follow-up: 4-6 weeks.      Nicole Varner  Pulmonary and Critical Care  [id number]     No

## 2021-11-02 ENCOUNTER — EMERGENCY (EMERGENCY)
Facility: HOSPITAL | Age: 86
LOS: 1 days | Discharge: HOME | End: 2021-11-02
Attending: EMERGENCY MEDICINE | Admitting: EMERGENCY MEDICINE
Payer: MEDICARE

## 2021-11-02 VITALS
SYSTOLIC BLOOD PRESSURE: 118 MMHG | OXYGEN SATURATION: 98 % | DIASTOLIC BLOOD PRESSURE: 75 MMHG | WEIGHT: 100.09 LBS | HEIGHT: 62.4 IN | RESPIRATION RATE: 17 BRPM | TEMPERATURE: 98 F | HEART RATE: 62 BPM

## 2021-11-02 VITALS — OXYGEN SATURATION: 98 % | TEMPERATURE: 98 F | RESPIRATION RATE: 16 BRPM | HEART RATE: 75 BPM
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
## 2021-11-02 DIAGNOSIS — I10 ESSENTIAL (PRIMARY) HYPERTENSION: ICD-10-CM

## 2021-11-02 DIAGNOSIS — S81.811A LACERATION WITHOUT FOREIGN BODY, RIGHT LOWER LEG, INITIAL ENCOUNTER: ICD-10-CM

## 2021-11-02 DIAGNOSIS — S01.01XA LACERATION WITHOUT FOREIGN BODY OF SCALP, INITIAL ENCOUNTER: ICD-10-CM

## 2021-11-02 DIAGNOSIS — F03.90 UNSPECIFIED DEMENTIA, UNSPECIFIED SEVERITY, WITHOUT BEHAVIORAL DISTURBANCE, PSYCHOTIC DISTURBANCE, MOOD DISTURBANCE, AND ANXIETY: ICD-10-CM

## 2021-11-02 DIAGNOSIS — F41.9 ANXIETY DISORDER, UNSPECIFIED: ICD-10-CM

## 2021-11-02 DIAGNOSIS — M25.561 PAIN IN RIGHT KNEE: ICD-10-CM

## 2021-11-02 DIAGNOSIS — E78.5 HYPERLIPIDEMIA, UNSPECIFIED: ICD-10-CM

## 2021-11-02 DIAGNOSIS — Z79.82 LONG TERM (CURRENT) USE OF ASPIRIN: ICD-10-CM

## 2021-11-02 DIAGNOSIS — Z20.822 CONTACT WITH AND (SUSPECTED) EXPOSURE TO COVID-19: ICD-10-CM

## 2021-11-02 DIAGNOSIS — M79.661 PAIN IN RIGHT LOWER LEG: ICD-10-CM

## 2021-11-02 DIAGNOSIS — R45.1 RESTLESSNESS AND AGITATION: ICD-10-CM

## 2021-11-02 DIAGNOSIS — F32.A DEPRESSION, UNSPECIFIED: ICD-10-CM
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
## 2021-11-02 DIAGNOSIS — R29.6 REPEATED FALLS: ICD-10-CM

## 2021-11-02 LAB — SARS-COV-2 RNA SPEC QL NAA+PROBE: NEGATIVE — SIGNIFICANT CHANGE UP

## 2021-11-02 PROCEDURE — 82962 GLUCOSE BLOOD TEST: CPT

## 2021-11-02 PROCEDURE — 70450 CT HEAD/BRAIN W/O DYE: CPT | Mod: MA

## 2021-11-02 PROCEDURE — 87635 SARS-COV-2 COVID-19 AMP PRB: CPT

## 2021-11-02 PROCEDURE — 70450 CT HEAD/BRAIN W/O DYE: CPT | Mod: 26,MA

## 2021-11-02 PROCEDURE — 99284 EMERGENCY DEPT VISIT MOD MDM: CPT

## 2021-11-02 PROCEDURE — 99284 EMERGENCY DEPT VISIT MOD MDM: CPT | Mod: 25

## 2021-11-02 RX ADMIN — Medication 0.5 MILLIGRAM(S): at 15:20

## 2021-11-02 NOTE — ED PROVIDER NOTE - PATIENT PORTAL LINK FT
You can access the FollowMyHealth Patient Portal offered by [facility] by registering at the following website: [email or web address]. By joining [employer]’s FollowMyHealth portal, you will also be able to view your health information using other applications (apps) compatible with our system.

## 2021-11-02 NOTE — ED ADULT TRIAGE NOTE - CHIEF COMPLAINT QUOTE
Patient c/o laceration to back of head and right lower leg s/p unwitnessed fall at [facility].  Per ems and HHA, patient was found by staff laying next to her bed this morning.  Hx dementia and is at baseline mentation as per HHA.  On aspirin 81mg.  Bleeding controlled.  Unknown tetanus.

## 2021-11-02 NOTE — ED PROVIDER NOTE - CARE PLAN
1 Principal Discharge DX:	Unwitnessed fall   Principal Discharge DX:	Unwitnessed fall  Secondary Diagnosis:	Scalp laceration  Secondary Diagnosis:	Noninfected skin tear of right leg

## 2021-11-02 NOTE — ED ADULT NURSE NOTE - CHIEF COMPLAINT QUOTE
Patient c/o laceration to back of head and right lower leg s/p unwitnessed fall at [facility].  Per ems and HHA, patient was found by staff laying next to her bed this morning.  Hx dementia and is at baseline mentation as per HHA.  On aspirin 81mg.  Bleeding controlled.  Unknown tetanus.

## 2021-11-02 NOTE — ED PROVIDER NOTE - NSFOLLOWUPINSTRUCTIONS_ED_ALL_ED_FT
Please wash laceration on head and R calf with mild soap and water 1-2x/ day and apply Bactrim ointment.    Pt refused staples to head laceration at this time. Care provider, daughter and patient aware head laceration may ooze.      Laceration    WHAT YOU NEED TO KNOW:    What is a laceration? A laceration is an injury to the skin and the soft tissue underneath it. Lacerations can happen anywhere on the body.    What are the signs and symptoms of a laceration? Lacerations can be many shapes and sizes. The open skin may look like a cut, tear, or gash. The wound may hurt, bleed, bruise, or swell. Lacerations in certain areas of the body, such as the scalp, may bleed a lot. Your wound may have edges that are close together or wide apart. You may have numbness around the wound. You may have decreased movement in an area below the wound.    How is a laceration diagnosed? Tell your healthcare provider about how you got your laceration. He or she will examine your laceration and decide what treatment you need. An x-ray, ultrasound, CT, or MRI may show foreign objects in the wound. Foreign objects include metal, gravel, and glass. The tests may also show damage to deeper tissues. You may be given contrast liquid to help the injured area show up better in the pictures. Tell the healthcare provider if you have ever had an allergic reaction to contrast liquid. Do not enter the MRI room with anything metal. Metal can cause serious injury. Tell the healthcare provider if you have any metal in or on your body.    How will my laceration be treated? The treatment you will need depends on how large and deep the laceration is, and where it is located. It also depends on whether you have damage to deeper tissues. You may need any of the following:   •Pressure may be applied to stop any bleeding.      •Wound cleaning may be needed to remove dirt or debris. This will decrease the chance of infection. Your healthcare provider may need to look in your laceration for foreign objects. He or she may give you medicine to numb the area and decrease pain. He or she may also give you medicine to help you relax.      •Wound closure with stitches, staples, tissue glue, or medical strips may be needed. Your healthcare provider may need to give you medicine to numb the area and decrease pain. He or she may also give you medicine to help you relax. These may help the wound heal and prevent infection. Stitches may decrease the amount of scarring you have. Some lacerations may heal better without stitches.          •Medicine to treat pain or prevent infection may be given. You may also be given a tetanus shot. Your healthcare provider will decide if you need a tetanus shot. Wounds at high risk for tetanus infection include wounds with dirt or saliva in them. You should get a tetanus shot within 72 hours of getting a laceration or wound. Tell your healthcare provider if you have had the tetanus vaccine or a booster within the last 5 years.      •Surgery may be needed if your laceration needs a lot of cleaning or removal of foreign objects.      When should I seek immediate care?   •You have heavy bleeding or bleeding that does not stop after 10 minutes of holding firm, direct pressure over the wound.      •Your stitches come apart.      When should I call my doctor?   •You have a fever or chills.      •Your laceration is red, warm, or swollen.      •You have red streaks on your skin coming from your wound.      •You have white or yellow drainage from the wound that smells bad.      •You have pain that gets worse, even after treatment.      •You have questions or concerns about your condition or care.      Fall Prevention in the Home, Adult      Falls can cause injuries and can affect people from all age groups. There are many simple things that you can do to make your home safe and to help prevent falls. Ask for help when making these changes, if needed.      What actions can I take to prevent falls?    General instructions     •Use good lighting in all rooms. Replace any light bulbs that burn out.      •Turn on lights if it is dark. Use night-lights.      •Place frequently used items in easy-to-reach places. Lower the shelves around your home if necessary.      •Set up furniture so that there are clear paths around it. Avoid moving your furniture around.      •Remove throw rugs and other tripping hazards from the floor.      •Avoid walking on wet floors.      •Fix any uneven floor surfaces.      •Add color or contrast paint or tape to grab bars and handrails in your home. Place contrasting color strips on the first and last steps of stairways.      •When you use a stepladder, make sure that it is completely opened and that the sides are firmly locked. Have someone hold the ladder while you are using it. Do not climb a closed stepladder.      •Be aware of any and all pets.        What can I do in the bathroom?                   •Keep the floor dry. Immediately clean up any water that spills onto the floor.      •Remove soap buildup in the tub or shower on a regular basis.      •Use non-skid mats or decals on the floor of the tub or shower.      •Attach bath mats securely with double-sided, non-slip rug tape.      •If you need to sit down while you are in the shower, use a plastic, non-slip stool.      •Install grab bars by the toilet and in the tub and shower. Do not use towel bars as grab bars.      What can I do in the bedroom?     •Make sure that a bedside light is easy to reach.      • Do not use oversized bedding that drapes onto the floor.      •Have a firm chair that has side arms to use for getting dressed.      What can I do in the kitchen?     •Clean up any spills right away.      •If you need to reach for something above you, use a sturdy step stool that has a grab bar.      •Keep electrical cables out of the way.      • Do not use floor polish or wax that makes floors slippery. If you must use wax, make sure that it is non-skid floor wax.      What can I do in the stairways?     • Do not leave any items on the stairs.      •Make sure that you have a light switch at the top of the stairs and the bottom of the stairs. Have them installed if you do not have them.      •Make sure that there are handrails on both sides of the stairs. Fix handrails that are broken or loose. Make sure that handrails are as long as the stairways.      •Install non-slip stair treads on all stairs in your home.      •Avoid having throw rugs at the top or bottom of stairways, or secure the rugs with carpet tape to prevent them from moving.      •Choose a carpet design that does not hide the edge of steps on the stairway.      •Check any carpeting to make sure that it is firmly attached to the stairs. Fix any carpet that is loose or worn.      What can I do on the outside of my home?     •Use bright outdoor lighting.      •Regularly repair the edges of walkways and driveways and fix any cracks.      •Remove high doorway thresholds.      •Trim any shrubbery on the main path into your home.      •Regularly check that handrails are securely fastened and in good repair. Both sides of any steps should have handrails.      •Install guardrails along the edges of any raised decks or porches.      •Clear walkways of debris and clutter, including tools and rocks.      •Have leaves, snow, and ice cleared regularly.      •Use sand or salt on walkways during winter months.      •In the garage, clean up any spills right away, including grease or oil spills.      What other actions can I take?     •Wear closed-toe shoes that fit well and support your feet. Wear shoes that have rubber soles or low heels.      •Use mobility aids as needed, such as canes, walkers, scooters, and crutches.      •Review your medicines with your health care provider. Some medicines can cause dizziness or changes in blood pressure, which increase your risk of falling.      Talk with your health care provider about other ways that you can decrease your risk of falls. This may include working with a physical therapist or  to improve your strength, balance, and endurance.      Where to find more information    •Centers for Disease Control and Prevention, [place]: https://www.cdc.gov      •[place] [employer]: https://em1dyxu.iliana.nih.gov        Contact a health care provider if:    •You are afraid of falling at home.      •You feel weak, drowsy, or dizzy at home.      •You fall at home.      Summary    •There are many simple things that you can do to make your home safe and to help prevent falls.      •Ways to make your home safe include removing tripping hazards and installing grab bars in the bathroom.      •Ask for help when making these changes in your home.

## 2021-11-02 NOTE — ED PROVIDER NOTE - OBJECTIVE STATEMENT
89F progressive dementia, episodic psychosis, depression/anxiety, HTN, HLD, R knee fx, Right hip intertrochanteric fx s/p ORIF with MD Peng (05/19/21) s/p [facility]itnessed fall at [facility]. 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 pt takes aspirin 81mg. pt denies any pain on hips upon palpation, foot, leg (other than laceration site). pt unable to provide any other ros.

## 2021-11-02 NOTE — ED PROVIDER NOTE - CLINICAL SUMMARY MEDICAL DECISION MAKING FREE TEXT BOX
89F progressive dementia, episodic psychosis, depression/anxiety, HTN, HLD, R knee fx, Right hip intertrochanteric fx s/p ORIF with MD Peng (05/19/21) s/p unwitnessed fall at [facility]. 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 pt takes aspirin 81mg. pt denies any pain on hips upon palpation, foot, leg (other than laceration site). pt unable to provide any other ros.    pending imaging

## 2021-11-02 NOTE — ED ADULT NURSE NOTE - NSIMPLEMENTINTERV_GEN_ALL_ED
Implemented All Fall with Harm Risk Interventions:  [place] to call system. Call bell, personal items and telephone within reach. Instruct patient to call for assistance. Room bathroom lighting operational. Non-slip footwear when patient is off stretcher. Physically safe environment: no spills, clutter or unnecessary equipment. Stretcher in lowest position, wheels locked, appropriate side rails in place. Provide visual cue, wrist band, yellow gown, etc. Monitor gait and stability. Monitor for mental status changes and reorient to person, place, and time. Review medications for side effects contributing to fall risk. Reinforce activity limits and safety measures with patient and family. Provide visual clues: red socks.

## 2021-11-02 NOTE — ED ADULT NURSE NOTE - OBJECTIVE STATEMENT
Pt presenting from nursing home s/p  unwitnessed fall. Pt has hx of dementia and is disoriented x3. Pt oriented to first name only. Pt has aide at bedside. Dried blood noted to L side of head. Bandaged laceration to RLE. Pt has bilateral arm bandages that are old as per aide. FS 86. Pt taken to CT. Breathing unlabored on RA.

## 2021-11-02 NOTE — ED PEDIATRIC NURSE REASSESSMENT NOTE - NS ED NURSE REASSESS COMMENT FT2
Pt discharged pending transportation. Pt became agitated, attempting to get out of bed. Ativan 0.5 mg PO administered. Pt reoriented and seated in a chair with daughter at bedside. Pt wearing non slip socks.

## 2021-11-02 NOTE — ED PROVIDER NOTE - ATTENDING CONTRIBUTION TO CARE
Attending Statement: I have personally performed a face to face diagnostic evaluation on this patient. I have reviewed the ACP note and agree with the history, exam and plan of care, except as noted.     Attending Contribution to Care:  89F progressive dementia, episodic psychosis, depression/anxiety, HTN, HLD, R knee fx, Right hip intertrochanteric fx s/p ORIF with MD Peng (05/19/21) s/p unwitnessed fall at [facility]. 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 pt takes aspirin 81mg. pt denies any pain on hips upon palpation, foot, leg (other than laceration site). pt unable to provide any other ros.  CT head negative, scalp lac and RLE skin tear where cleaned and dressed, scalp lac with no active bleeding, daughter refused staples. Pt given ativan 0.5mg for agitation (she is prescribed this at the assisted living) and Pt now at baseline, no indication for further workup at this time, covid swab sent and pt stable for DC back to [place].

## 2021-12-25 ENCOUNTER — INPATIENT (INPATIENT)
Facility: HOSPITAL | Age: 86
LOS: 3 days | Discharge: NURSING, REHAB OR LONG-TERM CARE | DRG: 871 | End: 2021-12-29
Attending: FAMILY MEDICINE | Admitting: HOSPITALIST
Payer: MEDICARE

## 2021-12-25 VITALS
RESPIRATION RATE: 18 BRPM | HEIGHT: 62.4 IN | SYSTOLIC BLOOD PRESSURE: 160 MMHG | WEIGHT: 100.09 LBS | HEART RATE: 114 BPM | OXYGEN SATURATION: 95 % | DIASTOLIC BLOOD PRESSURE: 120 MMHG | TEMPERATURE: 103 F

## 2021-12-25 DIAGNOSIS — F03.90 UNSPECIFIED DEMENTIA, UNSPECIFIED SEVERITY, WITHOUT BEHAVIORAL DISTURBANCE, PSYCHOTIC DISTURBANCE, MOOD DISTURBANCE, AND ANXIETY: ICD-10-CM

## 2021-12-25 DIAGNOSIS — E44.1 MILD PROTEIN-CALORIE MALNUTRITION: ICD-10-CM

## 2021-12-25 DIAGNOSIS — I10 ESSENTIAL (PRIMARY) HYPERTENSION: ICD-10-CM

## 2021-12-25 DIAGNOSIS — E78.5 HYPERLIPIDEMIA, UNSPECIFIED: ICD-10-CM
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
## 2021-12-25 DIAGNOSIS — Z71.89 OTHER SPECIFIED COUNSELING: ICD-10-CM

## 2021-12-25 DIAGNOSIS — Z29.9 ENCOUNTER FOR PROPHYLACTIC MEASURES, UNSPECIFIED: ICD-10-CM

## 2021-12-25 DIAGNOSIS — A41.9 SEPSIS, UNSPECIFIED ORGANISM: ICD-10-CM

## 2021-12-25 DIAGNOSIS — N17.9 ACUTE KIDNEY FAILURE, UNSPECIFIED: ICD-10-CM

## 2021-12-25 DIAGNOSIS — S72.90XB UNSPECIFIED FRACTURE OF UNSPECIFIED FEMUR, INITIAL ENCOUNTER FOR OPEN FRACTURE TYPE I OR II: Chronic | ICD-10-CM

## 2021-12-25 DIAGNOSIS — F03.91 UNSPECIFIED DEMENTIA WITH BEHAVIORAL DISTURBANCE: ICD-10-CM

## 2021-12-25 DIAGNOSIS — D64.9 ANEMIA, UNSPECIFIED: ICD-10-CM

## 2021-12-25 DIAGNOSIS — U07.1 COVID-19: ICD-10-CM

## 2021-12-25 LAB
ALBUMIN SERPL ELPH-MCNC: 3.6 G/DL — SIGNIFICANT CHANGE UP (ref 3.3–5)
ALP SERPL-CCNC: 124 U/L — HIGH (ref 40–120)
ALT FLD-CCNC: 11 U/L — SIGNIFICANT CHANGE UP (ref 10–45)
ANION GAP SERPL CALC-SCNC: 11 MMOL/L — SIGNIFICANT CHANGE UP (ref 5–17)
APTT BLD: 31.4 SEC — SIGNIFICANT CHANGE UP (ref 27.5–35.5)
AST SERPL-CCNC: 24 U/L — SIGNIFICANT CHANGE UP (ref 10–40)
BASOPHILS # BLD AUTO: 0.06 K/UL — SIGNIFICANT CHANGE UP (ref 0–0.2)
BASOPHILS NFR BLD AUTO: 0.3 % — SIGNIFICANT CHANGE UP (ref 0–2)
BILIRUB SERPL-MCNC: 0.5 MG/DL — SIGNIFICANT CHANGE UP (ref 0.2–1.2)
BUN SERPL-MCNC: 20 MG/DL — SIGNIFICANT CHANGE UP (ref 7–23)
CALCIUM SERPL-MCNC: 9.4 MG/DL — SIGNIFICANT CHANGE UP (ref 8.4–10.5)
CHLORIDE SERPL-SCNC: 102 MMOL/L — SIGNIFICANT CHANGE UP (ref 96–108)
CO2 SERPL-SCNC: 24 MMOL/L — SIGNIFICANT CHANGE UP (ref 22–31)
CREAT SERPL-MCNC: 0.86 MG/DL — SIGNIFICANT CHANGE UP (ref 0.5–1.3)
EOSINOPHIL # BLD AUTO: 0 K/UL — SIGNIFICANT CHANGE UP (ref 0–0.5)
EOSINOPHIL NFR BLD AUTO: 0 % — SIGNIFICANT CHANGE UP (ref 0–6)
GLUCOSE SERPL-MCNC: 107 MG/DL — HIGH (ref 70–99)
HCT VFR BLD CALC: 35.2 % — SIGNIFICANT CHANGE UP (ref 34.5–45)
HGB BLD-MCNC: 11.4 G/DL — LOW (ref 11.5–15.5)
IMM GRANULOCYTES NFR BLD AUTO: 0.6 % — SIGNIFICANT CHANGE UP (ref 0–1.5)
INR BLD: 1.23 — HIGH (ref 0.88–1.16)
LACTATE SERPL-SCNC: 1.5 MMOL/L — SIGNIFICANT CHANGE UP (ref 0.5–2)
LYMPHOCYTES # BLD AUTO: 1.14 K/UL — SIGNIFICANT CHANGE UP (ref 1–3.3)
LYMPHOCYTES # BLD AUTO: 6.1 % — LOW (ref 13–44)
MCHC RBC-ENTMCNC: 28.1 PG — SIGNIFICANT CHANGE UP (ref 27–34)
MCHC RBC-ENTMCNC: 32.4 GM/DL — SIGNIFICANT CHANGE UP (ref 32–36)
MCV RBC AUTO: 86.7 FL — SIGNIFICANT CHANGE UP (ref 80–100)
MONOCYTES # BLD AUTO: 0.9 K/UL — SIGNIFICANT CHANGE UP (ref 0–0.9)
MONOCYTES NFR BLD AUTO: 4.8 % — SIGNIFICANT CHANGE UP (ref 2–14)
NEUTROPHILS # BLD AUTO: 16.61 K/UL — HIGH (ref 1.8–7.4)
NEUTROPHILS NFR BLD AUTO: 88.2 % — HIGH (ref 43–77)
NRBC # BLD: 0 /100 WBCS — SIGNIFICANT CHANGE UP (ref 0–0)
PLATELET # BLD AUTO: 359 K/UL — SIGNIFICANT CHANGE UP (ref 150–400)
POTASSIUM SERPL-MCNC: 4.5 MMOL/L — SIGNIFICANT CHANGE UP (ref 3.5–5.3)
POTASSIUM SERPL-SCNC: 4.5 MMOL/L — SIGNIFICANT CHANGE UP (ref 3.5–5.3)
PROCALCITONIN SERPL-MCNC: 1.74 NG/ML — HIGH (ref 0.02–0.1)
PROT SERPL-MCNC: 6.4 G/DL — SIGNIFICANT CHANGE UP (ref 6–8.3)
PROTHROM AB SERPL-ACNC: 14.6 SEC — HIGH (ref 10.6–13.6)
RBC # BLD: 4.06 M/UL — SIGNIFICANT CHANGE UP (ref 3.8–5.2)
RBC # FLD: 13.8 % — SIGNIFICANT CHANGE UP (ref 10.3–14.5)
SARS-COV-2 RNA SPEC QL NAA+PROBE: DETECTED
SODIUM SERPL-SCNC: 137 MMOL/L — SIGNIFICANT CHANGE UP (ref 135–145)
TROPONIN T SERPL-MCNC: 0.01 NG/ML — SIGNIFICANT CHANGE UP (ref 0–0.01)
TROPONIN T SERPL-MCNC: 0.02 NG/ML — HIGH (ref 0–0.01)
TSH SERPL-MCNC: 1.69 UIU/ML — SIGNIFICANT CHANGE UP (ref 0.27–4.2)
WBC # BLD: 18.82 K/UL — HIGH (ref 3.8–10.5)
WBC # FLD AUTO: 18.82 K/UL — HIGH (ref 3.8–10.5)

## 2021-12-25 PROCEDURE — 73110 X-RAY EXAM OF WRIST: CPT | Mod: 26,RT,77

## 2021-12-25 PROCEDURE — 99223 1ST HOSP IP/OBS HIGH 75: CPT | Mod: AI

## 2021-12-25 PROCEDURE — 73110 X-RAY EXAM OF WRIST: CPT | Mod: 26,LT

## 2021-12-25 PROCEDURE — 73130 X-RAY EXAM OF HAND: CPT | Mod: 26,RT

## 2021-12-25 PROCEDURE — 73521 X-RAY EXAM HIPS BI 2 VIEWS: CPT | Mod: 26

## 2021-12-25 PROCEDURE — 99285 EMERGENCY DEPT VISIT HI MDM: CPT

## 2021-12-25 PROCEDURE — 71045 X-RAY EXAM CHEST 1 VIEW: CPT | Mod: 26

## 2021-12-25 RX ORDER — CEFTRIAXONE 500 MG/1
1000 INJECTION, POWDER, FOR SOLUTION INTRAMUSCULAR; INTRAVENOUS EVERY 24 HOURS
Refills: 0 | Status: DISCONTINUED | OUTPATIENT
Start: 2021-12-25 | End: 2021-12-29

## 2021-12-25 RX ORDER — MULTIVIT-MIN/FERROUS GLUCONATE 9 MG/15 ML
1 LIQUID (ML) ORAL DAILY
Refills: 0 | Status: DISCONTINUED | OUTPATIENT
Start: 2021-12-25 | End: 2021-12-29

## 2021-12-25 RX ORDER — ACETAMINOPHEN 500 MG
650 TABLET ORAL EVERY 6 HOURS
Refills: 0 | Status: DISCONTINUED | OUTPATIENT
Start: 2021-12-25 | End: 2021-12-28

## 2021-12-25 RX ORDER — ENOXAPARIN SODIUM 100 MG/ML
40 INJECTION SUBCUTANEOUS EVERY 24 HOURS
Refills: 0 | Status: DISCONTINUED | OUTPATIENT
Start: 2021-12-25 | End: 2021-12-29

## 2021-12-25 RX ORDER — HEPARIN SODIUM 5000 [USP'U]/ML
5000 INJECTION INTRAVENOUS; SUBCUTANEOUS EVERY 8 HOURS
Refills: 0 | Status: DISCONTINUED | OUTPATIENT
Start: 2021-12-25 | End: 2021-12-25

## 2021-12-25 RX ORDER — SIMVASTATIN 20 MG/1
20 TABLET, FILM COATED ORAL AT BEDTIME
Refills: 0 | Status: DISCONTINUED | OUTPATIENT
Start: 2021-12-25 | End: 2021-12-29

## 2021-12-25 RX ORDER — POLYETHYLENE GLYCOL 3350 17 G/17G
17 POWDER, FOR SOLUTION ORAL DAILY
Refills: 0 | Status: DISCONTINUED | OUTPATIENT
Start: 2021-12-25 | End: 2021-12-29

## 2021-12-25 RX ORDER — SODIUM CHLORIDE 9 MG/ML
1000 INJECTION INTRAMUSCULAR; INTRAVENOUS; SUBCUTANEOUS ONCE
Refills: 0 | Status: COMPLETED | OUTPATIENT
Start: 2021-12-25 | End: 2021-12-25

## 2021-12-25 RX ORDER — PANTOPRAZOLE SODIUM 20 MG/1
20 TABLET, DELAYED RELEASE ORAL
Refills: 0 | Status: DISCONTINUED | OUTPATIENT
Start: 2021-12-25 | End: 2021-12-29

## 2021-12-25 RX ORDER — OLANZAPINE 15 MG/1
2.5 TABLET, FILM COATED ORAL ONCE
Refills: 0 | Status: COMPLETED | OUTPATIENT
Start: 2021-12-25 | End: 2021-12-25

## 2021-12-25 RX ORDER — ACETAMINOPHEN 500 MG
650 TABLET ORAL ONCE
Refills: 0 | Status: COMPLETED | OUTPATIENT
Start: 2021-12-25 | End: 2021-12-25

## 2021-12-25 RX ORDER — LANOLIN ALCOHOL/MO/W.PET/CERES
3 CREAM (GRAM) TOPICAL AT BEDTIME
Refills: 0 | Status: DISCONTINUED | OUTPATIENT
Start: 2021-12-25 | End: 2021-12-29

## 2021-12-25 RX ORDER — ESCITALOPRAM OXALATE 10 MG/1
20 TABLET, FILM COATED ORAL DAILY
Refills: 0 | Status: DISCONTINUED | OUTPATIENT
Start: 2021-12-25 | End: 2021-12-29

## 2021-12-25 RX ORDER — SODIUM CHLORIDE 9 MG/ML
1000 INJECTION INTRAMUSCULAR; INTRAVENOUS; SUBCUTANEOUS
Refills: 0 | Status: DISCONTINUED | OUTPATIENT
Start: 2021-12-25 | End: 2021-12-26

## 2021-12-25 RX ORDER — OLANZAPINE 15 MG/1
2.5 TABLET, FILM COATED ORAL AT BEDTIME
Refills: 0 | Status: DISCONTINUED | OUTPATIENT
Start: 2021-12-25 | End: 2021-12-26

## 2021-12-25 RX ADMIN — SODIUM CHLORIDE 1000 MILLILITER(S): 9 INJECTION INTRAMUSCULAR; INTRAVENOUS; SUBCUTANEOUS at 04:41

## 2021-12-25 RX ADMIN — SODIUM CHLORIDE 1000 MILLILITER(S): 9 INJECTION INTRAMUSCULAR; INTRAVENOUS; SUBCUTANEOUS at 07:00

## 2021-12-25 RX ADMIN — PANTOPRAZOLE SODIUM 20 MILLIGRAM(S): 20 TABLET, DELAYED RELEASE ORAL at 12:44

## 2021-12-25 RX ADMIN — CEFTRIAXONE 100 MILLIGRAM(S): 500 INJECTION, POWDER, FOR SOLUTION INTRAMUSCULAR; INTRAVENOUS at 18:43

## 2021-12-25 RX ADMIN — Medication 0.5 MILLIGRAM(S): at 04:41

## 2021-12-25 RX ADMIN — SODIUM CHLORIDE 50 MILLILITER(S): 9 INJECTION INTRAMUSCULAR; INTRAVENOUS; SUBCUTANEOUS at 11:08

## 2021-12-25 RX ADMIN — SIMVASTATIN 20 MILLIGRAM(S): 20 TABLET, FILM COATED ORAL at 21:42

## 2021-12-25 RX ADMIN — POLYETHYLENE GLYCOL 3350 17 GRAM(S): 17 POWDER, FOR SOLUTION ORAL at 12:43

## 2021-12-25 RX ADMIN — ESCITALOPRAM OXALATE 20 MILLIGRAM(S): 10 TABLET, FILM COATED ORAL at 12:39

## 2021-12-25 RX ADMIN — Medication 650 MILLIGRAM(S): at 07:09

## 2021-12-25 RX ADMIN — Medication 1 TABLET(S): at 12:38

## 2021-12-25 RX ADMIN — Medication 260 MILLIGRAM(S): at 06:16

## 2021-12-25 RX ADMIN — Medication 3 MILLIGRAM(S): at 21:42

## 2021-12-25 RX ADMIN — OLANZAPINE 2.5 MILLIGRAM(S): 15 TABLET, FILM COATED ORAL at 18:43

## 2021-12-25 RX ADMIN — ENOXAPARIN SODIUM 40 MILLIGRAM(S): 100 INJECTION SUBCUTANEOUS at 11:11

## 2021-12-25 RX ADMIN — OLANZAPINE 2.5 MILLIGRAM(S): 15 TABLET, FILM COATED ORAL at 21:42

## 2021-12-25 NOTE — ED ADULT NURSE NOTE - NSIMPLEMENTINTERV_GEN_ALL_ED
Implemented All Universal Safety Interventions:  [place] to call system. Call bell, personal items and telephone within reach. Instruct patient to call for assistance. Room bathroom lighting operational. Non-slip footwear when patient is off stretcher. Physically safe environment: no spills, clutter or unnecessary equipment. Stretcher in lowest position, wheels locked, appropriate side rails in place.

## 2021-12-25 NOTE — ED PROVIDER NOTE - MUSCULOSKELETAL, MLM
Spine appears normal, range of motion is not limited,  multiple ecchymosis to all extremities, old skin tear to right wrist, NROM to all extremities,

## 2021-12-25 NOTE — H&P ADULT - HISTORY OF PRESENT ILLNESS
89y F PMH dementia with episodic psychosis, frequent falls, depression/anxiety, HTN, HLD, h/o R femur 5/2021 presented for post-fall evaluation from nursing facility. Per EMS fell while getting out of bed. Patient denied dysuria, productive cough.    In the ED:    - VS: Tmax: 102.8, HR: 114, BP: 160/120, RR: 15, O2: 95%     - Pertinent Labs: WBC 18.82. Neutroph% 88.3. Hb 11.4. Alk phos 124.  COVID+    - Imaging: CXR: Lungs otherwise grossly clear. No   pleural effusion or pneumothorax  CT: US: Cath: EKG:     - Treatment/interventions: Tylenol 650 x1. Ativan 0.5 x1, NS bolus 2L.    PMHx: See above  PSHx: Femur repair 2021  Meds: See med rec  Allergies: NKA  Social: see below     pain/decreased strength   89y F PMH dementia with episodic psychosis, frequent falls, depression/anxiety, HTN, HLD, h/o R femur 5/2021 presented for post-fall evaluation from nursing facility. Per EMS fell while getting out of bed. Patient denied dysuria, productive cough.    In the ED:    - VS: Tmax: 102.8, HR: 114, BP: 160/120, RR: 15, O2: 95%     - Pertinent Labs: WBC 18.82. Neutroph% 88.3. Procal 1.74 Hb 11.4. Alk phos 124.  COVID+    - Imaging: CXR: Lungs otherwise grossly clear. No   pleural effusion or pneumothorax  CT: US: Cath: EKG:     - Treatment/interventions: Tylenol 650 x1. Ativan 0.5 x1, NS bolus 2L.    PMHx: See above  PSHx: Femur repair 2021  Meds: See med rec  Allergies: NKA  Social: see below       89y F PMH Alzheimer's dementia with psychosis and delusions, GREGORY, frequent falls,HTN, HLD, R femur fracture 5/2021 presented for post-fall evaluation from nursing facility. Per EMS fell while getting out of bed. Patient unable to provide history. Patient denied dysuria, productive cough.    In the ED:    - VS: Tmax: 102.8, HR: 114, BP: 160/120, RR: 15, O2: 95%     - Pertinent Labs: WBC 18.82. Neutroph% 88.3. Procal 1.74 Hb 11.4. Alk phos 124.  COVID+    - Imaging: CXR: Lungs otherwise grossly clear. No   pleural effusion or pneumothorax  CT: US: Cath: EKG:     - Treatment/interventions: Tylenol 650 x1. Ativan 0.5 x1, NS bolus 2L.    PMHx: See above  PSHx: Femur repair 2021  Meds: See med rec  Allergies: NKA  Social: see below       89y F PMH Alzheimer's dementia with psychosis and delusions, GREGORY, frequent falls,HTN, HLD, R femur fracture 5/2021 presented for post-fall evaluation from nursing facility. Per EMS fell while getting out of bed. Patient unable to provide history. Patient denied dysuria, productive cough.    In the ED:    - VS: Tmax: 102.8, HR: 114, BP: 160/120, RR: 15, O2: 95%     - Pertinent Labs: WBC 18.82. Neutroph% 88.3. Procal 1.74 Hb 11.4. Alk phos 124.  COVID+    - Imaging: CXR: Lungs otherwise grossly clear. No pleural effusion or pneumothorax  - EKG:     - Treatment/interventions: Tylenol 650 x1. Ativan 0.5 x1, NS bolus 2L.    PMHx: See above  PSHx: Femur repair 2021  Meds: See med rec  Allergies: NKA  Social: see below       89y F PMH Alzheimer's dementia with psychosis and delusions, GREGORY, frequent falls,HTN, HLD, R femur fracture 5/2021 presented for post-fall evaluation from nursing facility. Per EMS fell while getting out of bed. Patient unable to provide history. Patient denied dysuria, productive cough.    In the ED:    - VS: Tmax: 102.8, HR: 114, BP: 160/120, RR: 15, O2: 95%     - Pertinent Labs: WBC 18.82. Neutroph% 88.3 %Lympocyte 6.1. Procal 1.74. Hb 11.4. Alk phos 124.  COVID+    - Imaging: CXR: Lungs otherwise grossly clear. No pleural effusion or pneumothorax  - EKG:     - Treatment/interventions: Tylenol 650 x1. Ativan 0.5 x1, NS bolus 2L.    PMHx: See above  PSHx: Femur repair 2021  Meds: See med rec  Allergies: NKA  Social: see below       89y F PMH Alzheimer's dementia with psychosis and delusions, GREGORY, frequent falls, HTN, HLD, R femur fracture 5/2021 presented for post-fall evaluation from nursing facility. Per daughter had unwitnessed fall 11pm while getting out of bed, EMS was called due to concern for hand fracture. Multiple falls in the past including at this facility. Patient unable to provide history. Patient denied dysuria, productive cough. Non participatory in further questions.    In the ED:    - VS: Tmax: 102.8, HR: 114, BP: 160/120, RR: 15, O2: 95%     - Pertinent Labs: WBC 18.82. Neutroph% 88.3 %Lympocyte 6.1. Procal 1.74. Hb 11.4. Alk phos 124.  COVID+    - Imaging: CXR: Lungs otherwise grossly clear. No pleural effusion or pneumothorax  - EKG: Not performed    - Treatment/interventions: Tylenol 650 x1. Ativan 0.5 x1, NS bolus 2L.    PMHx: See above  PSHx: Femur repair 2021  Meds: See med rec  Allergies: NKA  Social: see below       89y F PMH Alzheimer's dementia with psychosis and delusions, GREGORY, frequent falls, HTN, HLD, R femur fracture 5/2021 presented for post-fall evaluation from nursing facility. Per daughter had unwitnessed fall 11pm while getting out of bed, EMS was called due to concern for hand fracture. Multiple falls in the past including at this facility. Patient unable to provide history. Patient denied dysuria, productive cough. Non participatory in further questions.    In the ED:    - VS: Tmax: 102.8, HR: 114, BP: 160/120, RR: 15, O2: 95%     - Pertinent Labs: WBC 18.82. Neutroph% 88.3 %Lympocyte 6.1. Procal 1.74. Hb 11.4. Alk phos 124.  COVID+    - Imaging: CXR: Lungs otherwise grossly clear. No pleural effusion or pneumothorax    - Treatment/interventions: Tylenol 650 x1. Ativan 0.5 x1, NS bolus 2L.    PMHx: See above  PSHx: Femur repair 2021  Meds: See med rec  Allergies: NKA  Social: see below       89y F PMH Alzheimer's dementia with psychosis and delusions, GREGORY, frequent falls, HTN, HLD, R femur fracture 5/2021 presented for post-fall evaluation from nursing facility. Per daughter had unwitnessed fall 11pm while getting out of bed, EMS was called due to concern for hand fracture. Multiple falls in the past including at this facility. Patient unable to provide history. Patient denied dysuria, productive cough. Non participatory in further questions.    In the ED:    - VS: Tmax: 102.8, HR: 114, BP: 160/120, RR: 15, O2: 95%     - Pertinent Labs: WBC 18.82. Neutroph% 88.3 %Lympocyte 6.1. Procal 1.74. Hb 11.4. Alk phos 124.  COVID+    - Imaging: CXR: Lungs otherwise grossly clear. No pleural effusion or pneumothorax    - Treatment/interventions: Tylenol 650 x1  Ativan 0.5 x1  NS bolus 2L    PMHx: See above  PSHx: Femur repair 2021  Meds: See med rec  Allergies: NKA  Social: see below       89y F PMH Alzheimer's dementia with psychosis and delusions, GREGORY, frequent falls, HTN, HLD, R femur fracture 5/2021 presented for post-fall evaluation from nursing facility. Per daughter had unwitnessed fall 11pm while getting out of bed, EMS was called due to concern for hand fracture. Multiple falls in the past including at this facility. Baseline mental status per NH is alert but not oriented.  Patient unable to provide history. Patient denied dysuria, productive cough. Non participatory in further questions.    In the ED:    - VS: Tmax: 102.8, HR: 114, BP: 160/120, RR: 15, O2: 95%     - Pertinent Labs: WBC 18.82. Neutroph% 88.3 %Lympocyte 6.1. Procal 1.74. Hb 11.4. Alk phos 124.  COVID+    - Imaging: CXR: Lungs otherwise grossly clear. No pleural effusion or pneumothorax    - Treatment/interventions: Tylenol 650 x1  Ativan 0.5 x1  NS bolus 2L    PMHx: See above  PSHx: Femur repair 2021  Meds: See med rec  Allergies: NKA  Social: see below       89y F PMH Alzheimer's dementia with delusions, GREGORY, frequent falls, HTN, HLD, R femur fracture 5/2021 presented for post-fall evaluation from nursing facility. Per daughter had unwitnessed fall 11pm while getting out of bed, EMS was called due to concern for hand fracture. Multiple falls in the past including at this facility. Baseline mental status per NH is alert but not oriented.  Patient unable to provide history. Patient denied dysuria, productive cough. Non participatory in further questions.    In the ED:    - VS: Tmax: 102.8, HR: 114, BP: 160/120, RR: 15, O2: 95%     - Pertinent Labs: WBC 18.82. Neutroph% 88.3 %Lympocyte 6.1. Procal 1.74. Hb 11.4. Alk phos 124.  COVID+    - Imaging: CXR: Lungs otherwise grossly clear. No pleural effusion or pneumothorax    - Treatment/interventions: Tylenol 650 x1  Ativan 0.5 x1  NS bolus 2L    PMHx: See above  PSHx: Femur repair 2021  Meds: See med rec  Allergies: NKA  Social: see below       89y F PMH Alzheimer's dementia with delusions, GREGORY, frequent falls, HTN, HLD, R femur fracture 5/2021 presented for post-fall evaluation from nursing facility. Per daughter had unwitnessed fall 11pm while getting out of bed, EMS was called due to concern for hand fracture. Multiple falls in the past including at this facility. Baseline mental status per NH is alert but not oriented.  Patient unable to provide history. Patient denied dysuria, productive cough. Non participatory in further questions.    In the ED:    - VS: Tmax: 102.8, HR: 114, BP: 160/120, RR: 15, O2: 95%     - Pertinent Labs: WBC 18.82. Neutroph% 88.3 %Lymphocyte 6.1. Procal 1.74. Hb 11.4. Alk phos 124.  COVID+    - Imaging: CXR: Lungs otherwise grossly clear. No pleural effusion or pneumothorax    - Treatment/interventions: Tylenol 650 x1  Ativan 0.5 x1  NS bolus 2L    PMHx: See above  PSHx: Femur repair 2021  Meds: See med rec  Allergies: NKA  Social: see below       89y F PMH Alzheimer's dementia with delusions, GREGORY, frequent falls, HTN, HLD, R femur fracture 5/2021 presented for post-fall evaluation from nursing facility. Per daughter had unwitnessed fall 11pm while getting out of bed, EMS was called due to concern for hand fracture. Multiple falls in the past including at this facility. Baseline mental status per NH is alert but not oriented.  Patient unable to provide history. Patient denied dysuria, productive cough. Non participatory in further questions.    In the ED:    - VS: Tmax: 102.8, HR: 114, BP: 160/120, RR: 15, O2: 95%     - Pertinent Labs: WBC 18.82. Neutroph% 88.3 %Lymphocyte 6.1. Procal 1.74. Hb 11.4. Alk phos 124.  COVID+    - Imaging: CXR: Lungs otherwise grossly clear. No pleural effusion or pneumothorax                   -XR hip: No acute fracture. XR L hand: No acute fracture.    - Treatment/interventions: Tylenol 650 x1  Ativan 0.5 x1  NS bolus 2L    PMHx: See above  PSHx: Femur repair 2021  Meds: See med rec  Allergies: NKA  Social: see below

## 2021-12-25 NOTE — ED PROVIDER NOTE - SKIN, MLM
Skin normal color for race, warm, dry and intact. No evidence of rash. no new lacerations, no bleeding

## 2021-12-25 NOTE — H&P ADULT - PROBLEM SELECTOR PLAN 5
Admission 160/100. Likely i/s/o of anxiety and agitation  -Formal med rec from nursing home  -If still htn when agitation resolves, add amlodipine 2.5mg Admission 160/100. Likely i/s/o of anxiety and agitation  -Does not take anti HTN meds  -If still htn when agitation resolves, add amlodipine 2.5mg Hb 11.4. May baseline 14.7. MCV 86.7.   -Maintain active T&S  -Transfuse if Hb<7 Hb 11.4 MCV 86.7 Unclear baseline, 5/2021 14.7. Tachycardia resolved HD stable. Dry on exam, may be i/s/o poor PO intake.  -F/u iron studies  -Maintain active T&S  -Transfuse if Hb<7

## 2021-12-25 NOTE — H&P ADULT - NSHPLABSRESULTS_GEN_ALL_CORE
LABS:                         11.4   18.82 >-----< 359           ( 12-25-21 @ 04:24 )             35.2       137  |  102  |   20  ----------------------< 107    (12-25-21 @ 04:24)     4.5  |  24  |  0.86    Anion Gap: 11    Ca   9.4   (12-25-21 @ 04:24)  Mg   x    (12-25-21 @ 04:24)  Phos x    (12-25-21 @ 04:24)       TP 9.4     |  AST 3.6    -------------------------     Alb x     |  ALT x               (12-25-21 @ 04:24)  -------------------------     T-bili 3.6  |  AlkPh 124    D-bili x   COAGULATION STUDIES:     aPTT  31.4 sec    (12-25-21 @ 04:39)     PT     14.6 sec    (12-25-21 @ 04:39)     INR    1.23          (12-25-21 @ 04:39)         I/O SUMMARY:

## 2021-12-25 NOTE — ED ADULT TRIAGE NOTE - CHIEF COMPLAINT QUOTE
unwitnessed fall on to her right forearm, nursing home reports right forearm is more bruised than normal

## 2021-12-25 NOTE — H&P ADULT - PROBLEM SELECTOR PLAN 4
0.86 cr 0.55 at baseline. Dry on exam, S/p 2L in ed.   -Encourage PO intake.  -Bedside speech and swallow. If fails or still dry despite PO, give mIVF  -If Cr not improve, obtain U lytes  -F/u bladder scan 0.86 cr 0.55 at baseline. Dry on exam, S/p 2L in ed.   CrCl 26--> Lovenox 40 i/s/o Covid   -Encourage PO intake.  -Bedside speech and swallow. If fails or still dry despite PO, give mIVF  -If Cr not improve, obtain U lytes  -F/u bladder scan Admission Cr 0.86,  0.55 at baseline. Dry on exam, S/p 2L in ed.   CrCl 26--> Lovenox 40 i/s/o Covid   -Encourage PO intake.  -Bedside speech and swallow. If fails or still dry despite PO, give mIVF  -If Cr not improve, obtain U lytes  -F/u bladder scan Admission Cr 0.86,  0.55 at baseline. Dry on exam, S/p 2L in ed.   CrCl 26--> Lovenox 40 i/s/o Covid   -Bedside speech and swallow--> encourage PO intake  -mIVF NS 50cc/hr x 12h  -If Cr not improve in AM, obtain U lytes  -F/u bladder scan Admission Cr 0.86,  0.55 at baseline. Dry on exam, S/p 2L in ed.   CrCl 26--> Lovenox 40 i/s/o Covid   -Encourage PO intake  -mIVF NS 50cc/hr x 12h  -If Cr not improve in AM, obtain U lytes  -F/u bladder scan Admission Cr 0.86,  0.55 at baseline. Dry on exam, S/p 2L in ed.   CrCl 26--> Lovenox 40 i/s/o Covid   -Encourage PO intake, dc pantoprazole after more intake  -mIVF NS 50cc/hr x 12h  -If Cr not improve in AM, obtain U lytes  -F/u bladder scan

## 2021-12-25 NOTE — H&P ADULT - PROBLEM SELECTOR PLAN 9
Discussed with daughter advanced directives. Patient has document that states no extraordinary measures if there is "no hope". Discussion re: chest compressions on her frail frame and risk of fractures, pneumothorax etc however wants to have compressions but be DNI

## 2021-12-25 NOTE — H&P ADULT - PROBLEM SELECTOR PLAN 1
Hx falls with prior admissions. Lives in nursing home. Atraumatic head on exam.  -Collateral re: head trauma  -PT consult Hx falls with prior admissions. Lives in nursing home. Atraumatic head on exam.  -Collateral re: head trauma  -F/u formal reads of XR wrist, hand, pelvis  -PT consult Hx falls with prior admissions. Lives in nursing home. Atraumatic head on exam.  -Fall precautions, enhanced with sitter for dementia  -Collateral re: head trauma  -F/u formal reads of XR wrist, hand, pelvis  -PT consult Hx falls with prior admissions. Lives in nursing home. Unwitnessed fall. Atraumatic head on exam. TSH wnl  -Fall precautions, enhanced with sitter for dementia  -F/u formal reads of XR wrist, hand, pelvis  -PT consult 3/4 SIRS (Fever, HR, leukocytosis) with suspected source covid. Lactate 1.5 wnl. Procal 1.74 Likely covid with superimposed bacterial pna (+procal). CXR grossly unremarkable. No suprapubic or RUQ tenderness. S/p 2L in ED. 5/2021 admission with UTI  -mIVF given sepsis + VALENTINE mindful of overload risk i/s/o covid   - CTX 1g x5 days for CAP  -See covid plan below   -F/u UA, urine legionella  -EKG on floors: Non-sinus, p waves present, irregular R-R intervals  -F/u Repeat EKG

## 2021-12-25 NOTE — ED PROVIDER NOTE - CLINICAL SUMMARY MEDICAL DECISION MAKING FREE TEXT BOX
89 yo female with h/o progressive dementia, psychosis, depression, anxiety, HTN, HLD, BIBEMS from  NS for evaluation after she from off her bed. Pt also was tested covid-19+ yesterday. Pt is poor historian. febrile and tachycardiac in the triage. plan : labs, CXR, covidPCR, IV hydration, most likely will need to be admitted for further management.

## 2021-12-25 NOTE — H&P ADULT - PROBLEM SELECTOR PLAN 11
Fluids: NS 50cc/hr x 12 hr  Electrolytes: replete as necessary, K>4, Mg>2  Nutrition: Soft diet  Bowel Regimen: Miralax  DVT ppx: Lovenox 40 qd  GI ppx: Pantoprazole 20mg home med  Code: Full  Disposition: medical floor

## 2021-12-25 NOTE — H&P ADULT - PROBLEM SELECTOR PLAN 8
Fluids: None   Electrolytes: replete as necessary, K>4, Mg>2  Nutrition: F/u S/S eval  Bowel Regimen: Miralax  DVT ppx: Lovenox 40  GI ppx: None if tolerates PO  Code: Full/DNR/DNI  Disposition: medical floor Fluids: None   Electrolytes: replete as necessary, K>4, Mg>2  Nutrition: F/u S/S eval  Bowel Regimen: Miralax  DVT ppx: Lovenox 40  GI ppx: None if tolerates PO  Code: Full  Disposition: medical floor At nursing home on olanzapine 2.5 at night, lexapro 20mg qd, ativan 0.5BID with 0.5 x1 PRN. Previously on haldol.  -Continue lexapro  -Holding ativan as precaution Collateral from NH: Alert but not oriented. Admission AAOx0. Meds at nursing home: olanzapine 2.5 at night, lexapro 20mg qd, ativan 0.5BID with 0.5 x1 PRN. Previously on haldol.  -Continue lexapro  -Holding ativan as precaution Collateral from NH: Alert but not oriented. Admission AAOx0. Meds at nursing home: olanzapine 2.5 at night, lexapro 20mg qd, ativan 0.5BID with 0.5 x1 PRN. Previously on haldol.   -Continue lexapro  -Continue olanzapine 2.5 at night  -Holding ativan as precaution

## 2021-12-25 NOTE — ED PROVIDER NOTE - OBJECTIVE STATEMENT
89 yo female progressive dementia, episodic psychosis, depression/anxiety, htn, hld, h/o R knee fx, BIBEMS from the assisted living facility for evaluation. As per EMS pt was getting out of her bed and fall. Per medical records review pt has h/o multiple falls recently. Pt is confused and unable provide any h/o.   Spoke to her daughter Mrs. Lesly Piña ([phone]) who mentioned that her mother was tested covid+ yesterday, and she had no symptoms.

## 2021-12-25 NOTE — ED PROVIDER NOTE - ATTENDING CONTRIBUTION TO CARE
Attending Statement: I have personally performed a face to face diagnostic evaluation on this patient. I have reviewed the ACP note and agree with the history, exam and plan of care, except as noted.     Attending Contribution to Care:  89y F PMH dementia with episodic psychosis, frequent falls, depression/anxiety, HTN, HLD, h/o R femur 5/2021 with known COVID exposure at nursing home COVID+ without hypoxia, labs and Xrays ordered and pt will likely need to be admitted for post-fall evaluation.

## 2021-12-25 NOTE — H&P ADULT - PROBLEM SELECTOR PLAN 3
Known exposure at nursing home. Saturating well on room air, not in resp distress. CXR grossly clear  -Does not meet criteria for remdesivir or decadron  -Monitor oxygen status Known exposure at nursing home. Saturating well on room air, not in respiratory distress. CXR grossly clear  -Does not meet criteria for remdesivir or decadron  -Monitor oxygen status Known exposure at nursing home. Saturating well on room air, not in respiratory distress. CXR grossly clear  -Does not meet criteria for remdesivir or decadron  -Monitor oxygen status  -Acetaminophen 650 q6prn for fever

## 2021-12-25 NOTE — H&P ADULT - PROBLEM SELECTOR PLAN 6
Previously on haldol 0.5po q8h prn, lexapro 20mg qd, ativan 0.25BID for anxiety  -Med rec from nursing home At nursing home on olanzapine 2.5 at night, lexapro 20mg qd, ativan 0.5BID with 0.5 x1 PRN. Previously on haldol.  -Continue lexapro  -Holding ativan as precaution Admission 160/100. Likely i/s/o of anxiety and agitation. Does not take anti HTN meds  -If still htn when agitation resolves, add amlodipine 2.5mg Admission 160/100. Likely i/s/o of anxiety and agitation. Does not take anti HTN meds  -No acute Admission 160/100. Likely i/s/o of anxiety and agitation. Does not take anti HTN meds  -No acute interventions

## 2021-12-25 NOTE — H&P ADULT - NSHPPHYSICALEXAM_GEN_ALL_CORE
PHYSICAL EXAM:    General. Anxious female responding to internal stimuli. Dry appearing  HEENT: Atraumatic head. PERRL. MMD  Respiratory: B/l crackles R>L  Cardiovascular: RRR, normal S1S2, no M/R/G  Gastrointestinal: soft, NTND, no masses palpable, BS normal. No RUQ TTP. No suprapubic tenderness or fullness.  Extremities: Warm, well perfused, pulses equal bilateral upper and lower extremities, no edema, no clubbing  Neurological: Not participatory. FROM extremities  Skin: Normal temperature, warm, dry. Bruising on shin. PHYSICAL EXAM:    General. Anxious female responding to internal stimuli. Dry appearing  HEENT: Atraumatic head. PERRL. MMD  Respiratory: B/l crackles R>L  Cardiovascular: RRR, normal S1S2, no M/R/G  Gastrointestinal: soft, NTND, no masses palpable, BS normal. No RUQ TTP. No suprapubic tenderness or fullness.  Extremities: Warm, well perfused, pulses equal bilateral upper and lower extremities, no edema, no clubbing  Neurological: Not participatory. FROM extremities  Skin: Normal temperature, warm, dry. Bruising on shin. Skin tear of R forearm PHYSICAL EXAM:    General. Anxious female responding to internal stimuli. Dry appearing  HEENT: Atraumatic head. PERRL. MMD  Respiratory: B/l crackles R>L  Cardiovascular: RRR, normal S1S2, no M/R/G  Gastrointestinal: soft, NTND, no masses palpable, BS normal. No RUQ TTP. No suprapubic tenderness or fullness.  Extremities: Warm, well perfused, pulses equal bilateral upper and lower extremities, no edema, no clubbing  Neurological: Not participatory. FROM extremities  Skin: Normal temperature, warm, dry. Bruising on shin. Skin tear of R forearm, L lower LE

## 2021-12-25 NOTE — H&P ADULT - ASSESSMENT
89y F PMH dementia with episodic psychosis, frequent falls, depression/anxiety, HTN, HLD, h/o R femur 5/2021 with known COVID exposure at nursing home COVID+ without hypoxia admitted for post-fall evaluation.

## 2021-12-25 NOTE — PATIENT PROFILE ADULT - FALL HARM RISK - HARM RISK INTERVENTIONS

## 2021-12-25 NOTE — H&P ADULT - ATTENDING COMMENTS
Fall Unlikely cardiogenic/neurogenic cause  cont Rx for sepsis due to CAP and covid. encephalopathy also due to polypharmacy  Monitor mental status and hold benzo and tramadol  Dementia w/u  Nutrition consult and high protein diet  Functional quadriplegia PPS<30%

## 2021-12-25 NOTE — ED PROVIDER NOTE - WET READ LAUNCH FT
There are 3 Wet Read(s) to document. There are 4 Wet Read(s) to document. There is 1 Wet Read(s) to document.

## 2021-12-25 NOTE — H&P ADULT - PROBLEM SELECTOR PLAN 2
2/4 SIRS (Fever, leukocytosis) with suspected source covid. Lactate 1.5 wnl. Likely covid vs superimprosed bacterial pna. CXR grossly unremarkable. No suprapubic tenderness. s/p 2L in ED  -mIVF due to [facility] mindful of covid status  -No abx for now as likely covid  -See covid plan below   -F/u procal  -F/u UA 2/4 SIRS (Fever, leukocytosis) with suspected source covid. Lactate 1.5 wnl. Procal 1.74 Likely covid with superimprosed bacterial pna. CXR grossly unremarkable. No suprapubic tenderness. s/p 2L in ED  -mIVF due to [facility] mindful of covid status  - CTX 1g x5 days for CAP  -See covid plan below   -F/u UA 2/4 SIRS (Fever, leukocytosis) with suspected source covid. Lactate 1.5 wnl. Procal 1.74 Likely covid with superimposed bacterial pna. CXR grossly unremarkable. No suprapubic tenderness. s/p 2L in ED  -mIVF due to [facility] mindful of overload risk i/s/o covid   - CTX 1g x5 days for CAP  -See covid plan below   -F/u UA 2/4 SIRS (Fever, leukocytosis) with suspected source covid. Lactate 1.5 wnl. Procal 1.74 Likely covid with superimposed bacterial pna. CXR grossly unremarkable. No suprapubic tenderness. s/p 2L in ED  -mIVF due to [facility] mindful of overload risk i/s/o covid   - CTX 1g x5 days for CAP  -See covid plan below   -F/u UA  -F/u EKG 2/4 SIRS (Fever, leukocytosis) with suspected source covid. Lactate 1.5 wnl. Procal 1.74 Likely covid with superimposed bacterial pna. CXR grossly unremarkable. No suprapubic tenderness. s/p 2L in ED  -mIVF due to [facility] mindful of overload risk i/s/o covid   - CTX 1g x5 days for CAP  -See covid plan below   -F/u UA  -EKG on floors: Non-sinus, p waves present, irregular R-R intervals  -F/u Repeat EKG 3/4 SIRS (Fever, HR, leukocytosis) with suspected source covid. Lactate 1.5 wnl. Procal 1.74 Likely covid with superimposed bacterial pna (+procal). CXR grossly unremarkable. No suprapubic tenderness. s/p 2L in ED  -mIVF due to [facility] mindful of overload risk i/s/o covid   - CTX 1g x5 days for CAP  -See covid plan below   -F/u UA  -EKG on floors: Non-sinus, p waves present, irregular R-R intervals  -F/u Repeat EKG 3/4 SIRS (Fever, HR, leukocytosis) with suspected source covid. Lactate 1.5 wnl. Procal 1.74 Likely covid with superimposed bacterial pna (+procal). CXR grossly unremarkable. No suprapubic tenderness. s/p 2L in ED  -mIVF given sepsis + [facility] mindful of overload risk i/s/o covid   - CTX 1g x5 days for CAP  -See covid plan below   -F/u UA  -EKG on floors: Non-sinus, p waves present, irregular R-R intervals  -F/u Repeat EKG 3/4 SIRS (Fever, HR, leukocytosis) with suspected source covid. Lactate 1.5 wnl. Procal 1.74 Likely covid with superimposed bacterial pna (+procal). CXR grossly unremarkable. No suprapubic tenderness. s/p 2L in ED. 5/2021 admission with UTI  -mIVF given sepsis + [facility] mindful of overload risk i/s/o covid   - CTX 1g x5 days for CAP  -See covid plan below   -F/u UA  -EKG on floors: Non-sinus, p waves present, irregular R-R intervals  -F/u Repeat EKG 3/4 SIRS (Fever, HR, leukocytosis) with suspected source covid. Lactate 1.5 wnl. Procal 1.74 Likely covid with superimposed bacterial pna (+procal). CXR grossly unremarkable. No suprapubic or RUQ tenderness. S/p 2L in ED. 5/2021 admission with UTI  -mIVF given sepsis + [facility] mindful of overload risk i/s/o covid   - CTX 1g x5 days for CAP  -See covid plan below   -F/u UA  -EKG on floors: Non-sinus, p waves present, irregular R-R intervals  -F/u Repeat EKG Hx falls with prior admissions. Lives in nursing home. Unwitnessed fall. Atraumatic head on exam. TSH wnl. Can consider cardiac etiology, neuro, polypharmacy (standing benzodiazapine in elderly) in frail female, Toxic metabolic encephelopathy from Covid and CAP  -Fall precautions, enhanced with sitter for dementia  -F/u B12 RPR  -CT head  -PT consult

## 2021-12-25 NOTE — ED PROVIDER NOTE - CARE PLAN
Principal Discharge DX:	Multiple falls   1 Principal Discharge DX:	Multiple falls  Secondary Diagnosis:	Fever

## 2021-12-25 NOTE — H&P ADULT - PROBLEM SELECTOR PLAN 7
Fluids: None   Electrolytes: replete as necessary, K>4, Mg>2  Nutrition: F/u S/S eval  Bowel Regimen: Miralax  DVT ppx: Heparin 5000 q8  GI ppx: None if tolerates PO  Code: Full/DNR/DNI  Disposition: medical floor At nursing home on olanzapine 2.5 at night, lexapro 20mg qd, ativan 0.5BID with 0.5 x1 PRN. Previously on haldol.  -Continue lexapro  -Holding ativan as precaution Home med: Simvastatin 20mg  -Continue home med

## 2021-12-25 NOTE — H&P ADULT - PROBLEM SELECTOR PLAN 10
Fluids: NS 50cc/hr x 12 hr  Electrolytes: replete as necessary, K>4, Mg>2  Nutrition: F/u S/S eval  Bowel Regimen: Miralax  DVT ppx: Lovenox 40 qd  GI ppx: Pantoprazole 20mg home med  Code: Full  Disposition: medical floor Fluids: NS 50cc/hr x 12 hr  Electrolytes: replete as necessary, K>4, Mg>2  Nutrition: Regular  Bowel Regimen: Miralax  DVT ppx: Lovenox 40 qd  GI ppx: Pantoprazole 20mg home med  Code: Full  Disposition: medical floor BMI 18.1, frail elderly female  -Encourage PO intake  -Nutrition consult

## 2021-12-26 LAB
ANISOCYTOSIS BLD QL: SLIGHT — SIGNIFICANT CHANGE UP
APPEARANCE UR: CLEAR — SIGNIFICANT CHANGE UP
BACTERIA # UR AUTO: PRESENT /HPF
BASOPHILS # BLD AUTO: 0 K/UL — SIGNIFICANT CHANGE UP (ref 0–0.2)
BASOPHILS NFR BLD AUTO: 0 % — SIGNIFICANT CHANGE UP (ref 0–2)
BILIRUB UR-MCNC: NEGATIVE — SIGNIFICANT CHANGE UP
BLD GP AB SCN SERPL QL: NEGATIVE — SIGNIFICANT CHANGE UP
BUN SERPL-MCNC: 14 MG/DL — SIGNIFICANT CHANGE UP (ref 7–23)
CALCIUM SERPL-MCNC: 9.2 MG/DL — SIGNIFICANT CHANGE UP (ref 8.4–10.5)
CHLORIDE SERPL-SCNC: 109 MMOL/L — HIGH (ref 96–108)
CO2 SERPL-SCNC: 25 MMOL/L — SIGNIFICANT CHANGE UP (ref 22–31)
COLOR SPEC: YELLOW — SIGNIFICANT CHANGE UP
CREAT SERPL-MCNC: 0.65 MG/DL — SIGNIFICANT CHANGE UP (ref 0.5–1.3)
DIFF PNL FLD: ABNORMAL
EOSINOPHIL # BLD AUTO: 0 K/UL — SIGNIFICANT CHANGE UP (ref 0–0.5)
EOSINOPHIL NFR BLD AUTO: 0 % — SIGNIFICANT CHANGE UP (ref 0–6)
EPI CELLS # UR: SIGNIFICANT CHANGE UP /HPF (ref 0–5)
FOLATE SERPL-MCNC: 10.3 NG/ML — SIGNIFICANT CHANGE UP
GIANT PLATELETS BLD QL SMEAR: PRESENT — SIGNIFICANT CHANGE UP
GLUCOSE SERPL-MCNC: 106 MG/DL — HIGH (ref 70–99)
GLUCOSE UR QL: NEGATIVE — SIGNIFICANT CHANGE UP
HCT VFR BLD CALC: 34.8 % — SIGNIFICANT CHANGE UP (ref 34.5–45)
HGB BLD-MCNC: 11.2 G/DL — LOW (ref 11.5–15.5)
KETONES UR-MCNC: ABNORMAL MG/DL
LEUKOCYTE ESTERASE UR-ACNC: NEGATIVE — SIGNIFICANT CHANGE UP
LYMPHOCYTES # BLD AUTO: 0.56 K/UL — LOW (ref 1–3.3)
LYMPHOCYTES # BLD AUTO: 5.2 % — LOW (ref 13–44)
MAGNESIUM SERPL-MCNC: 1.7 MG/DL — SIGNIFICANT CHANGE UP (ref 1.6–2.6)
MANUAL SMEAR VERIFICATION: SIGNIFICANT CHANGE UP
MCHC RBC-ENTMCNC: 28.6 PG — SIGNIFICANT CHANGE UP (ref 27–34)
MCHC RBC-ENTMCNC: 32.2 GM/DL — SIGNIFICANT CHANGE UP (ref 32–36)
MCV RBC AUTO: 89 FL — SIGNIFICANT CHANGE UP (ref 80–100)
MICROCYTES BLD QL: SLIGHT — SIGNIFICANT CHANGE UP
MONOCYTES # BLD AUTO: 0.1 K/UL — SIGNIFICANT CHANGE UP (ref 0–0.9)
MONOCYTES NFR BLD AUTO: 0.9 % — LOW (ref 2–14)
NEUTROPHILS # BLD AUTO: 10.15 K/UL — HIGH (ref 1.8–7.4)
NEUTROPHILS NFR BLD AUTO: 93.9 % — HIGH (ref 43–77)
NITRITE UR-MCNC: NEGATIVE — SIGNIFICANT CHANGE UP
PH UR: 6 — SIGNIFICANT CHANGE UP (ref 5–8)
PHOSPHATE SERPL-MCNC: 2.9 MG/DL — SIGNIFICANT CHANGE UP (ref 2.5–4.5)
PLAT MORPH BLD: ABNORMAL
PLATELET # BLD AUTO: 346 K/UL — SIGNIFICANT CHANGE UP (ref 150–400)
POIKILOCYTOSIS BLD QL AUTO: SLIGHT — SIGNIFICANT CHANGE UP
POTASSIUM SERPL-MCNC: 3.6 MMOL/L — SIGNIFICANT CHANGE UP (ref 3.5–5.3)
POTASSIUM SERPL-SCNC: 3.6 MMOL/L — SIGNIFICANT CHANGE UP (ref 3.5–5.3)
PROT UR-MCNC: NEGATIVE MG/DL — SIGNIFICANT CHANGE UP
RBC # BLD: 3.91 M/UL — SIGNIFICANT CHANGE UP (ref 3.8–5.2)
RBC # FLD: 13.9 % — SIGNIFICANT CHANGE UP (ref 10.3–14.5)
RBC BLD AUTO: ABNORMAL
RBC CASTS # UR COMP ASSIST: < 5 /HPF — SIGNIFICANT CHANGE UP
RH IG SCN BLD-IMP: POSITIVE — SIGNIFICANT CHANGE UP
SODIUM SERPL-SCNC: 143 MMOL/L — SIGNIFICANT CHANGE UP (ref 135–145)
SP GR SPEC: 1.02 — SIGNIFICANT CHANGE UP (ref 1–1.03)
SPHEROCYTES BLD QL SMEAR: SLIGHT — SIGNIFICANT CHANGE UP
UROBILINOGEN FLD QL: 0.2 E.U./DL — SIGNIFICANT CHANGE UP
VIT B12 SERPL-MCNC: 354 PG/ML — SIGNIFICANT CHANGE UP (ref 232–1245)
VIT B12 SERPL-MCNC: 422 PG/ML — SIGNIFICANT CHANGE UP (ref 232–1245)
WBC # BLD: 10.81 K/UL — HIGH (ref 3.8–10.5)
WBC # FLD AUTO: 10.81 K/UL — HIGH (ref 3.8–10.5)
WBC UR QL: < 5 /HPF — SIGNIFICANT CHANGE UP

## 2021-12-26 PROCEDURE — 99232 SBSQ HOSP IP/OBS MODERATE 35: CPT

## 2021-12-26 RX ORDER — MAGNESIUM SULFATE 500 MG/ML
2 VIAL (ML) INJECTION ONCE
Refills: 0 | Status: COMPLETED | OUTPATIENT
Start: 2021-12-26 | End: 2021-12-26

## 2021-12-26 RX ORDER — SODIUM CHLORIDE 9 MG/ML
1000 INJECTION, SOLUTION INTRAVENOUS
Refills: 0 | Status: DISCONTINUED | OUTPATIENT
Start: 2021-12-26 | End: 2021-12-26

## 2021-12-26 RX ORDER — OLANZAPINE 15 MG/1
2.5 TABLET, FILM COATED ORAL AT BEDTIME
Refills: 0 | Status: DISCONTINUED | OUTPATIENT
Start: 2021-12-26 | End: 2021-12-29

## 2021-12-26 RX ORDER — OLANZAPINE 15 MG/1
2.5 TABLET, FILM COATED ORAL ONCE
Refills: 0 | Status: COMPLETED | OUTPATIENT
Start: 2021-12-26 | End: 2021-12-26

## 2021-12-26 RX ADMIN — ENOXAPARIN SODIUM 40 MILLIGRAM(S): 100 INJECTION SUBCUTANEOUS at 13:24

## 2021-12-26 RX ADMIN — PANTOPRAZOLE SODIUM 20 MILLIGRAM(S): 20 TABLET, DELAYED RELEASE ORAL at 06:15

## 2021-12-26 RX ADMIN — OLANZAPINE 2.5 MILLIGRAM(S): 15 TABLET, FILM COATED ORAL at 01:09

## 2021-12-26 RX ADMIN — OLANZAPINE 2.5 MILLIGRAM(S): 15 TABLET, FILM COATED ORAL at 19:39

## 2021-12-26 RX ADMIN — Medication 3 MILLIGRAM(S): at 21:35

## 2021-12-26 RX ADMIN — OLANZAPINE 2.5 MILLIGRAM(S): 15 TABLET, FILM COATED ORAL at 14:06

## 2021-12-26 RX ADMIN — Medication 1 TABLET(S): at 13:05

## 2021-12-26 RX ADMIN — ESCITALOPRAM OXALATE 20 MILLIGRAM(S): 10 TABLET, FILM COATED ORAL at 13:06

## 2021-12-26 RX ADMIN — SIMVASTATIN 20 MILLIGRAM(S): 20 TABLET, FILM COATED ORAL at 21:36

## 2021-12-26 RX ADMIN — Medication 1 TABLET(S): at 13:06

## 2021-12-26 RX ADMIN — CEFTRIAXONE 100 MILLIGRAM(S): 500 INJECTION, POWDER, FOR SOLUTION INTRAMUSCULAR; INTRAVENOUS at 18:37

## 2021-12-26 RX ADMIN — POLYETHYLENE GLYCOL 3350 17 GRAM(S): 17 POWDER, FOR SOLUTION ORAL at 13:05

## 2021-12-26 RX ADMIN — Medication 25 GRAM(S): at 20:45

## 2021-12-26 RX ADMIN — SODIUM CHLORIDE 40 MILLILITER(S): 9 INJECTION, SOLUTION INTRAVENOUS at 13:28

## 2021-12-26 NOTE — PROGRESS NOTE ADULT - PROBLEM SELECTOR PLAN 11
Fluids: NS 50cc/hr x 12 hr  Electrolytes: replete as necessary, K>4, Mg>2  Nutrition: Soft diet  Bowel Regimen: Miralax  DVT ppx: Lovenox 40 qd  GI ppx: Pantoprazole 20mg home med  Code: Full  Disposition: medical floor Fluids:  D5 1/2 NS 40cc/hr x 24 hrs   Electrolytes: replete as necessary, K>4, Mg>2  Nutrition: pureed   Bowel Regimen: Miralax  DVT ppx: Lovenox 40 qd  GI ppx: Pantoprazole 20mg home med  Code: Full  Disposition: medical floor

## 2021-12-26 NOTE — DIETITIAN INITIAL EVALUATION ADULT. - PROBLEM SELECTOR PLAN 2
Hx falls with prior admissions. Lives in nursing home. Unwitnessed fall. Atraumatic head on exam. TSH wnl. Can consider cardiac etiology, neuro, polypharmacy (standing benzodiazapine in elderly) in frail female, Toxic metabolic encephelopathy from Covid and CAP  -Fall precautions, enhanced with sitter for dementia  -F/u B12 RPR  -CT head  -PT consult

## 2021-12-26 NOTE — PROGRESS NOTE ADULT - PROBLEM SELECTOR PLAN 1
3/4 SIRS (Fever, HR, leukocytosis) with suspected source covid. Lactate 1.5 wnl. Procal 1.74 Likely covid with superimposed bacterial pna (+procal). CXR grossly unremarkable. No suprapubic or RUQ tenderness. S/p 2L in ED. 5/2021 admission with UTI  -mIVF given sepsis + [facility] mindful of overload risk i/s/o covid   - CTX 1g x5 days for CAP  - See covid plan below   - UA negative  - f/u urine legionella 3/4 SIRS (Fever, HR, leukocytosis) with suspected source covid. Lactate 1.5 wnl. Procal 1.74 Likely covid with superimposed bacterial pna (+procal). CXR grossly unremarkable. No suprapubic or RUQ tenderness. S/p 2L in ED. 5/2021 admission with UTI  -mIVF given sepsis + [facility] mindful of overload risk i/s/o covid   - CTX 1g x5 days for CAP  - See covid plan below   - UA negative  - BCx 12/25 NGTD  - f/u urine legionella

## 2021-12-26 NOTE — DIETITIAN INITIAL EVALUATION ADULT. - OTHER INFO
89y F PMH dementia with episodic psychosis, frequent falls, depression/anxiety, HTN, HLD, h/o R femur 5/2021 with known COVID exposure at nursing home COVID+ without hypoxia admitted for post-fall evaluation. Nutrition screen trigger/Cx for BMI <19.    Pt seen in room asleep in bed, left to rest. Currently on pureed diet and tolerating well. Per 1:1 PCA, pt consumed oatmeal, apple suace, 2 bites of eggs, and 2 bites of sausage for breakfast. No apparent GI distress, had a small BM this morning. Per prior RD note, pt's daughter reported that patient's usual body weight is 90lbs. Unable to obtain more indepth subjective information on diet/weight history as pt was A&Ox0. Noted to have wound/abrasions from fall, harshad score 11. Recommend adding on EnsureEnlive TID (1050kcal, 60g pro) for additional kcal/pro support, in additio to MVI. Patient w/ muscle wasting, likely attributable to age related sarcopenia and limited mobility causing more muscle wasting. Recommending EnsureEnlive 2/2 HMB protein composition to help w/ muscle synthesis. No nutritionally pertinent labs. Continue with bowel regimen. RD to follow.

## 2021-12-26 NOTE — DIETITIAN INITIAL EVALUATION ADULT. - PROBLEM SELECTOR PLAN 6
Admission 160/100. Likely i/s/o of anxiety and agitation. Does not take anti HTN meds  -No acute interventions

## 2021-12-26 NOTE — DIETITIAN INITIAL EVALUATION ADULT. - PROBLEM SELECTOR PLAN 4
Admission Cr 0.86,  0.55 at baseline. Dry on exam, S/p 2L in ed.   CrCl 26--> Lovenox 40 i/s/o Covid   -Encourage PO intake, dc pantoprazole after more intake  -mIVF NS 50cc/hr x 12h  -If Cr not improve in AM, obtain U lytes  -F/u bladder scan

## 2021-12-26 NOTE — DIETITIAN INITIAL EVALUATION ADULT. - PROBLEM SELECTOR PLAN 1
3/4 SIRS (Fever, HR, leukocytosis) with suspected source covid. Lactate 1.5 wnl. Procal 1.74 Likely covid with superimposed bacterial pna (+procal). CXR grossly unremarkable. No suprapubic or RUQ tenderness. S/p 2L in ED. 5/2021 admission with UTI  -mIVF given sepsis + [facility] mindful of overload risk i/s/o covid   - CTX 1g x5 days for CAP  -See covid plan below   -F/u UA, urine legionella  -EKG on floors: Non-sinus, p waves present, irregular R-R intervals  -F/u Repeat EKG

## 2021-12-26 NOTE — DIETITIAN INITIAL EVALUATION ADULT. - ORAL INTAKE PTA/DIET HISTORY
difficult to assess 2/2 A&Ox0 however weight appears to be the stable from last admission. Patient had weighed 105lbs (11/2021), daughter reported UBW of 90lbs.

## 2021-12-26 NOTE — DIETITIAN INITIAL EVALUATION ADULT. - OTHER CALCULATIONS
ABW used for calculations as pt between % of IBW (101%). IBW and estimated needs based on Quadriplegia. Needs adjusted for age, catabolic state/muscle wasting, sepsis

## 2021-12-26 NOTE — DIETITIAN INITIAL EVALUATION ADULT. - PROBLEM SELECTOR PLAN 8
Collateral from NH: Alert but not oriented. Admission AAOx0. Meds at nursing home: olanzapine 2.5 at night, lexapro 20mg qd, ativan 0.5BID with 0.5 x1 PRN. Previously on haldol.   -Continue lexapro  -Continue olanzapine 2.5 at night  -Holding ativan as precaution

## 2021-12-26 NOTE — PROGRESS NOTE ADULT - SUBJECTIVE AND OBJECTIVE BOX
INTERVAL HPI/OVERNIGHT EVENTS: NAOE    SUBJECTIVE: Patient seen and examined at bedside. Patient report    Patient denies   Rest of ROS negative.     VITAL SIGNS:  T(F): 97.5 (12-26-21 @ 06:33)  HR: 61 (12-26-21 @ 06:33)  BP: 128/73 (12-25-21 @ 21:02)  RR: 19 (12-26-21 @ 06:33)  SpO2: 97% (12-26-21 @ 06:33)  Wt(kg): --    PHYSICAL EXAM:    General. Anxious female responding to internal stimuli. Dry appearing  HEENT: Atraumatic head. PERRL. MMM  Respiratory: B/l crackles R>L  Cardiovascular: RRR, normal S1S2, no M/R/G  Gastrointestinal: soft, NTND, no masses palpable, BS normal. No RUQ TTP. No suprapubic tenderness or fullness.  Extremities: Warm, well perfused, pulses equal bilateral upper and lower extremities, no edema, no clubbing  Neurological: Not participatory. FROM extremities  Skin: Normal temperature, warm, dry. Bruising on shin. Skin tear of R forearm, L lower LE    MEDICATIONS  (STANDING):  cefTRIAXone   IVPB 1000 milliGRAM(s) IV Intermittent every 24 hours  enoxaparin Injectable 40 milliGRAM(s) SubCutaneous every 24 hours  escitalopram 20 milliGRAM(s) Oral daily  melatonin 3 milliGRAM(s) Oral at bedtime  multivitamin/minerals 1 Tablet(s) Oral daily  OLANZapine 2.5 milliGRAM(s) Oral at bedtime  pantoprazole    Tablet 20 milliGRAM(s) Oral before breakfast  polyethylene glycol 3350 17 Gram(s) Oral daily  simvastatin 20 milliGRAM(s) Oral at bedtime  sodium chloride 0.9%. 1000 milliLiter(s) (50 mL/Hr) IV Continuous <Continuous>    MEDICATIONS  (PRN):  acetaminophen     Tablet .. 650 milliGRAM(s) Oral every 6 hours PRN Temp greater or equal to 38C (100.4F), Mild Pain (1 - 3)      Allergies    No Known Allergies    Intolerances        LABS:                        11.4   18.82 )-----------( 359      ( 25 Dec 2021 04:24 )             35.2     12-25    137  |  102  |  20  ----------------------------<  107<H>  4.5   |  24  |  0.86    Ca    9.4      25 Dec 2021 04:24    TPro  6.4  /  Alb  3.6  /  TBili  0.5  /  DBili  x   /  AST  24  /  ALT  11  /  AlkPhos  124<H>  12-25    PT/INR - ( 25 Dec 2021 04:39 )   PT: 14.6 sec;   INR: 1.23          PTT - ( 25 Dec 2021 04:39 )  PTT:31.4 sec      RADIOLOGY & ADDITIONAL TESTS:     INTERVAL HPI/OVERNIGHT EVENTS: 1am lung check lungs with mild crackles at bases. patient without increased WOB, breathing comfortably and satting well on RA, so fluids continued. extra Zyprexa 2.5mg PO for agitation (not re-directable)    SUBJECTIVE: Patient seen and examined at bedside. Patient w/ dementia that upon questioning says yes to mild SOB and anxiety. Patient denies cough, chest pain, palpitations, fevers, chills. Rest of ROS negative.     VITAL SIGNS:  T(F): 97.5 (12-26-21 @ 06:33)  HR: 61 (12-26-21 @ 06:33)  BP: 128/73 (12-25-21 @ 21:02)  RR: 19 (12-26-21 @ 06:33)  SpO2: 97% (12-26-21 @ 06:33)  Wt(kg): --    PHYSICAL EXAM:    General. Anxious female responding to internal stimuli. Dry appearing  HEENT: Atraumatic head. PERRL. MMM  Respiratory: B/l crackles R>L  Cardiovascular: RRR, normal S1S2, no M/R/G  Gastrointestinal: soft, NTND, no masses palpable, BS normal. No RUQ TTP. No suprapubic tenderness or fullness.  Extremities: Warm, well perfused, pulses equal bilateral upper and lower extremities, no edema, no clubbing  Neurological: Not participatory. FROM extremities  Skin: Normal temperature, warm, dry. Bruising on shin. Skin tear of R forearm, L lower LE    MEDICATIONS  (STANDING):  cefTRIAXone   IVPB 1000 milliGRAM(s) IV Intermittent every 24 hours  enoxaparin Injectable 40 milliGRAM(s) SubCutaneous every 24 hours  escitalopram 20 milliGRAM(s) Oral daily  melatonin 3 milliGRAM(s) Oral at bedtime  multivitamin/minerals 1 Tablet(s) Oral daily  OLANZapine 2.5 milliGRAM(s) Oral at bedtime  pantoprazole    Tablet 20 milliGRAM(s) Oral before breakfast  polyethylene glycol 3350 17 Gram(s) Oral daily  simvastatin 20 milliGRAM(s) Oral at bedtime  sodium chloride 0.9%. 1000 milliLiter(s) (50 mL/Hr) IV Continuous <Continuous>    MEDICATIONS  (PRN):  acetaminophen     Tablet .. 650 milliGRAM(s) Oral every 6 hours PRN Temp greater or equal to 38C (100.4F), Mild Pain (1 - 3)      Allergies    No Known Allergies    Intolerances        LABS:                        11.4   18.82 )-----------( 359      ( 25 Dec 2021 04:24 )             35.2     12-25    137  |  102  |  20  ----------------------------<  107<H>  4.5   |  24  |  0.86    Ca    9.4      25 Dec 2021 04:24    TPro  6.4  /  Alb  3.6  /  TBili  0.5  /  DBili  x   /  AST  24  /  ALT  11  /  AlkPhos  124<H>  12-25    PT/INR - ( 25 Dec 2021 04:39 )   PT: 14.6 sec;   INR: 1.23          PTT - ( 25 Dec 2021 04:39 )  PTT:31.4 sec      RADIOLOGY & ADDITIONAL TESTS:

## 2021-12-26 NOTE — PROGRESS NOTE ADULT - PROBLEM SELECTOR PLAN 8
Collateral from NH: Alert but not oriented. Admission AAOx0. Meds at nursing home: olanzapine 2.5 at night, lexapro 20mg qd, ativan 0.5BID with 0.5 x1 PRN. Previously on haldol.   -Continue lexapro  -Continue olanzapine 2.5 at night  -Holding ativan as precaution Collateral from NH: Alert but not oriented. Admission AAOx0. Meds at nursing home: olanzapine 2.5 at night, lexapro 20mg qd, ativan 0.5BID with 0.5 x1 PRN. Previously on haldol.   -Continue lexapro  -Continue olanzapine 2.5 at night PRN  -Holding ativan as precaution

## 2021-12-26 NOTE — PROGRESS NOTE ADULT - PROBLEM SELECTOR PLAN 5
Hb 11.4 MCV 86.7 Unclear baseline, 5/2021 14.7. Tachycardia resolved HD stable. Dry on exam, may be i/s/o poor PO intake.  -F/u iron studies  -Maintain active T&S  -Transfuse if Hb<7 Hb 11.4 MCV 86.7 Unclear baseline, 5/2021 14.7. Tachycardia resolved HD stable. Dry on exam, may be i/s/o poor PO intake.  - iron studies  -Maintain active T&S  -Transfuse if Hb<7  --------------- Hb 11.4 MCV 86.7 Unclear baseline, 5/2021 14.7. Tachycardia resolved HD stable. Dry on exam, may be i/s/o poor PO intake.  - f/u iron studies   -Maintain active T&S  -Transfuse if Hb<7

## 2021-12-26 NOTE — DIETITIAN INITIAL EVALUATION ADULT. - CALCULATED FROM (G/KG)
Burow's Advancement Flap Text: Given the location of the defect, inherent tension at the surgical site, and the proximity to free margins a Burow’s advancement flap was deemed most appropriate for wound reconstruction. The risks, benefits, and possible outcomes of this procedure were discussed along with the risks, benefits, and possible outcomes of other options for wound repair (including but not limited to: complex closure, other flaps, grafts, and second intention). The patient verbalized understanding and consent to the outlined procedure. The patient verbalized understanding that intraoperative conditions may necessitate a change in the outlined procedure resulting in modification of the original surgical plan. This decision may be made at the discretion of the surgeon, and is due to factors subject to change or that are difficult to predict preoperatively. Using a sterile surgical marker, an appropriate Burow’s advancement flap was drawn incorporating the defect and placing the expected incisions within the relaxed skin tension lines where possible. The surgical site and surrounding skin were prepped and draped in sterile fashion.  Standing cones were removed from opposite ends of the defect within the appropriate surgical plane, repositioning as necessary based upon local tension, proximity to free margins/other anatomic structures, and position of the relaxed skin tension lines. The position of one dog ear was modified, moving the standing cone along a lateral plane to lie in a more favorable location for closure.  The resulting defect was undermined widely and bilaterally in the appropriate surgical plane taking care to preserve local arteries, veins, nerves, and other structures of anatomic importance. This created a sliding flap capable of advancing across the defect to close the wound under minimal tension. Hemostasis was achieved and then the wound was sutured in layered fashion. 54.36

## 2021-12-26 NOTE — PROGRESS NOTE ADULT - PROBLEM SELECTOR PLAN 4
Admission Cr 0.86,  0.55 at baseline. Dry on exam, S/p 2L in ed.   CrCl 26--> Lovenox 40 i/s/o Covid   -Encourage PO intake, dc pantoprazole after more intake  -mIVF NS 50cc/hr x 12h  -If Cr not improve in AM, obtain U lytes  -F/u bladder scan Admission Cr 0.86,  0.55 at baseline. Dry on exam, S/p 2L in ed.   CrCl 26--> Lovenox 40 i/s/o Covid   -Encourage PO intake, dc pantoprazole after more intake  -mIVF NS 50cc/hr x 12h  - creatinine improving

## 2021-12-26 NOTE — DIETITIAN INITIAL EVALUATION ADULT. - PROBLEM SELECTOR PLAN 5
Hb 11.4 MCV 86.7 Unclear baseline, 5/2021 14.7. Tachycardia resolved HD stable. Dry on exam, may be i/s/o poor PO intake.  -F/u iron studies  -Maintain active T&S  -Transfuse if Hb<7

## 2021-12-26 NOTE — PROGRESS NOTE ADULT - PROBLEM SELECTOR PLAN 2
Hx falls with prior admissions. Lives in nursing home. Unwitnessed fall. Atraumatic head on exam. TSH wnl. Can consider cardiac etiology, neuro, polypharmacy (standing benzodiazapine in elderly) in frail female, Toxic metabolic encephelopathy from Covid and CAP  -Fall precautions, enhanced with sitter for dementia  -F/u B12 RPR  -CT head 12/25  -PT consult Hx falls with prior admissions. Lives in nursing home. Unwitnessed fall. Atraumatic head on exam. TSH wnl. Can consider cardiac etiology, neuro, polypharmacy (standing benzodiazapine in elderly) in frail female, Toxic metabolic encephelopathy from Covid and CAP  -Fall precautions, enhanced with sitter for dementia  - B12  354 WNL   - F/U CT head 12/25  - F/U PT consult

## 2021-12-26 NOTE — DIETITIAN INITIAL EVALUATION ADULT. - PROBLEM SELECTOR PLAN 3
Known exposure at nursing home. Saturating well on room air, not in respiratory distress. CXR grossly clear  -Does not meet criteria for remdesivir or decadron  -Monitor oxygen status  -Acetaminophen 650 q6prn for fever

## 2021-12-26 NOTE — PROGRESS NOTE ADULT - PROBLEM SELECTOR PLAN 9
Discussed with daughter advanced directives. Patient has document that states no extraordinary measures if there is "no hope". Discussion re: chest compressions on her frail frame and risk of fractures, pneumothorax etc however wants to have compressions but be DNI Discussed with daughter advanced directives. Patient has document that states no extraordinary measures if there is "no hope". Discussion re: chest compressions on her frail frame and risk of fractures, pneumothorax etc however wants to have compressions but be DNI      # nutrition  Patient on pureed diet  - f/u bedside evaluation  - c/w IVF D5W 1/2 NS   - f/u w family to assess baseline function of nutrition, altered mental status and physical limitations

## 2021-12-26 NOTE — DIETITIAN INITIAL EVALUATION ADULT. - ADD RECOMMEND
1. Recommend adding on EnsureEnlive TID (1050kcal, 60g pro). 2. MVI daily 3. Appreciate support and encouragement provided at meal times.

## 2021-12-27 LAB
ANION GAP SERPL CALC-SCNC: 14 MMOL/L — SIGNIFICANT CHANGE UP (ref 5–17)
BASOPHILS # BLD AUTO: 0.05 K/UL — SIGNIFICANT CHANGE UP (ref 0–0.2)
BASOPHILS NFR BLD AUTO: 0.4 % — SIGNIFICANT CHANGE UP (ref 0–2)
BUN SERPL-MCNC: 9 MG/DL — SIGNIFICANT CHANGE UP (ref 7–23)
CALCIUM SERPL-MCNC: 9.7 MG/DL — SIGNIFICANT CHANGE UP (ref 8.4–10.5)
CHLORIDE SERPL-SCNC: 107 MMOL/L — SIGNIFICANT CHANGE UP (ref 96–108)
CO2 SERPL-SCNC: 24 MMOL/L — SIGNIFICANT CHANGE UP (ref 22–31)
CREAT SERPL-MCNC: 0.51 MG/DL — SIGNIFICANT CHANGE UP (ref 0.5–1.3)
EOSINOPHIL # BLD AUTO: 0.21 K/UL — SIGNIFICANT CHANGE UP (ref 0–0.5)
EOSINOPHIL NFR BLD AUTO: 1.8 % — SIGNIFICANT CHANGE UP (ref 0–6)
GLUCOSE SERPL-MCNC: 103 MG/DL — HIGH (ref 70–99)
HCT VFR BLD CALC: 41.2 % — SIGNIFICANT CHANGE UP (ref 34.5–45)
HGB BLD-MCNC: 12.5 G/DL — SIGNIFICANT CHANGE UP (ref 11.5–15.5)
IMM GRANULOCYTES NFR BLD AUTO: 0.6 % — SIGNIFICANT CHANGE UP (ref 0–1.5)
LYMPHOCYTES # BLD AUTO: 1.35 K/UL — SIGNIFICANT CHANGE UP (ref 1–3.3)
LYMPHOCYTES # BLD AUTO: 11.7 % — LOW (ref 13–44)
MAGNESIUM SERPL-MCNC: 2.2 MG/DL — SIGNIFICANT CHANGE UP (ref 1.6–2.6)
MCHC RBC-ENTMCNC: 26.8 PG — LOW (ref 27–34)
MCHC RBC-ENTMCNC: 30.3 GM/DL — LOW (ref 32–36)
MCV RBC AUTO: 88.4 FL — SIGNIFICANT CHANGE UP (ref 80–100)
MONOCYTES # BLD AUTO: 0.8 K/UL — SIGNIFICANT CHANGE UP (ref 0–0.9)
MONOCYTES NFR BLD AUTO: 6.9 % — SIGNIFICANT CHANGE UP (ref 2–14)
NEUTROPHILS # BLD AUTO: 9.04 K/UL — HIGH (ref 1.8–7.4)
NEUTROPHILS NFR BLD AUTO: 78.6 % — HIGH (ref 43–77)
NRBC # BLD: 0 /100 WBCS — SIGNIFICANT CHANGE UP (ref 0–0)
PHOSPHATE SERPL-MCNC: 3.2 MG/DL — SIGNIFICANT CHANGE UP (ref 2.5–4.5)
PLATELET # BLD AUTO: 407 K/UL — HIGH (ref 150–400)
POTASSIUM SERPL-MCNC: 3.6 MMOL/L — SIGNIFICANT CHANGE UP (ref 3.5–5.3)
POTASSIUM SERPL-SCNC: 3.6 MMOL/L — SIGNIFICANT CHANGE UP (ref 3.5–5.3)
RBC # BLD: 4.66 M/UL — SIGNIFICANT CHANGE UP (ref 3.8–5.2)
RBC # FLD: 13.7 % — SIGNIFICANT CHANGE UP (ref 10.3–14.5)
SODIUM SERPL-SCNC: 145 MMOL/L — SIGNIFICANT CHANGE UP (ref 135–145)
T PALLIDUM AB TITR SER: NEGATIVE — SIGNIFICANT CHANGE UP
WBC # BLD: 11.52 K/UL — HIGH (ref 3.8–10.5)
WBC # FLD AUTO: 11.52 K/UL — HIGH (ref 3.8–10.5)

## 2021-12-27 PROCEDURE — 99232 SBSQ HOSP IP/OBS MODERATE 35: CPT

## 2021-12-27 RX ADMIN — ENOXAPARIN SODIUM 40 MILLIGRAM(S): 100 INJECTION SUBCUTANEOUS at 13:09

## 2021-12-27 RX ADMIN — Medication 1 TABLET(S): at 13:10

## 2021-12-27 RX ADMIN — POLYETHYLENE GLYCOL 3350 17 GRAM(S): 17 POWDER, FOR SOLUTION ORAL at 13:10

## 2021-12-27 RX ADMIN — CEFTRIAXONE 100 MILLIGRAM(S): 500 INJECTION, POWDER, FOR SOLUTION INTRAMUSCULAR; INTRAVENOUS at 18:27

## 2021-12-27 RX ADMIN — Medication 3 MILLIGRAM(S): at 23:20

## 2021-12-27 RX ADMIN — ESCITALOPRAM OXALATE 20 MILLIGRAM(S): 10 TABLET, FILM COATED ORAL at 13:10

## 2021-12-27 RX ADMIN — PANTOPRAZOLE SODIUM 20 MILLIGRAM(S): 20 TABLET, DELAYED RELEASE ORAL at 05:14

## 2021-12-27 RX ADMIN — OLANZAPINE 2.5 MILLIGRAM(S): 15 TABLET, FILM COATED ORAL at 23:36

## 2021-12-27 RX ADMIN — SIMVASTATIN 20 MILLIGRAM(S): 20 TABLET, FILM COATED ORAL at 23:20

## 2021-12-27 NOTE — PROGRESS NOTE ADULT - PROBLEM SELECTOR PLAN 5
Hb 11.4 MCV 86.7 Unclear baseline, 5/2021 14.7. Tachycardia resolved HD stable. Dry on exam, may be i/s/o poor PO intake.  - f/u iron studies   -Maintain active T&S  -Transfuse if Hb<7 No

## 2021-12-27 NOTE — PHYSICAL THERAPY INITIAL EVALUATION ADULT - IMPAIRMENTS FOUND, PT EVAL
aerobic capacity/endurance/ergonomics and body mechanics/fine motor/gait, locomotion, and balance/gross motor/integumentary integrity/joint integrity and mobility/muscle strength/poor safety awareness/posture/ROM

## 2021-12-27 NOTE — PROGRESS NOTE ADULT - PROBLEM SELECTOR PLAN 4
Admission Cr 0.86,  0.55 at baseline. Dry on exam, S/p 2L in ed.   CrCl 26--> Lovenox 40 i/s/o Covid   -Encourage PO intake, dc pantoprazole after more intake  -mIVF NS 50cc/hr x 12h  - creatinine improving

## 2021-12-27 NOTE — CONSULT NOTE ADULT - ASSESSMENT
per Internal Medicine    89 yo F PMH dementia with episodic psychosis, frequent falls, depression/anxiety, HTN, HLD, h/o R femur 5/2021 with known COVID exposure at nursing home COVID+ without hypoxia admitted for post-fall evaluation.    Problem/Plan - 1:  ·  Problem: Sepsis.   ·  Plan: 3/4 SIRS (Fever, HR, leukocytosis) with suspected source covid. Lactate 1.5 wnl. Procal 1.74 Likely covid with superimposed bacterial pna (+procal). CXR grossly unremarkable. No suprapubic or RUQ tenderness. S/p 2L in ED. 5/2021 admission with UTI  -mIVF given sepsis + VALENTINE mindful of overload risk i/s/o covid   - CTX 1g x5 days for CAP  - See covid plan below   - UA negative  - BCx 12/25 NGTD  - f/u urine legionella.    Problem/Plan - 2:  ·  Problem: Fall.   ·  Plan: Hx falls with prior admissions. Lives in nursing home. Unwitnessed fall. Atraumatic head on exam. TSH wnl. Can consider cardiac etiology, neuro, polypharmacy (standing benzodiazapine in elderly) in frail female, Toxic metabolic encephelopathy from Covid and CAP  -Fall precautions, enhanced with sitter for dementia  - B12  354 WNL   - F/U CT head 12/25  - F/U PT consult.    Problem/Plan - 3:  ·  Problem: 2019 novel coronavirus disease (COVID-19).   ·  Plan: Known exposure at nursing home. Saturating well on room air, not in respiratory distress. CXR grossly clear  -Does not meet criteria for remdesivir or decadron  -Monitor oxygen status  -Acetaminophen 650 q6prn for fever.    Problem/Plan - 4:  ·  Problem: VALENTINE (acute kidney injury).   ·  Plan: Admission Cr 0.86,  0.55 at baseline. Dry on exam, S/p 2L in ed.   CrCl 26--> Lovenox 40 i/s/o Covid   -Encourage PO intake, dc pantoprazole after more intake  -mIVF NS 50cc/hr x 12h  - creatinine improving.    Problem/Plan - 5:  ·  Problem: Anemia.   ·  Plan: Hb 11.4 MCV 86.7 Unclear baseline, 5/2021 14.7. Tachycardia resolved HD stable. Dry on exam, may be i/s/o poor PO intake.  - f/u iron studies   -Maintain active T&S  -Transfuse if Hb<7.    Problem/Plan - 6:  ·  Problem: Hypertension.   ·  Plan: Admission 160/100. Likely i/s/o of anxiety and agitation. Does not take anti HTN meds  -No acute interventions.    Problem/Plan - 7:  ·  Problem: Hyperlipemia.   ·  Plan: Home med: Simvastatin 20mg  -Continue home med.    Problem/Plan - 8:  ·  Problem: Severe dementia.   ·  Plan: Collateral from NH: Alert but not oriented. Admission AAOx0. Meds at nursing home: olanzapine 2.5 at night, lexapro 20mg qd, ativan 0.5BID with 0.5 x1 PRN. Previously on haldol.   -Continue lexapro  -Continue olanzapine 2.5 at night PRN  -Holding ativan as precaution.    Problem/Plan - 9:  ·  Problem: Counseling regarding goals of care.   ·  Plan: Discussed with daughter advanced directives. Patient has document that states no extraordinary measures if there is "no hope". Discussion re: chest compressions on her frail frame and risk of fractures, pneumothorax etc however wants to have compressions but be DNI      # nutrition  Patient on pureed diet  - f/u bedside evaluation  - c/w IVF D5W 1/2 NS   - f/u w family to assess baseline function of nutrition, altered mental status and physical limitations.    Problem/Plan - 10:  ·  Problem: Malnutrition of mild degree.   ·  Plan; BMI 18.1, frail elderly female  -Encourage PO intake  -Nutrition consult.    Problem/Plan - 11:  ·  Problem: Prophylactic measure.   ·  Plan: Fluids:  D5 1/2 NS 40cc/hr x 24 hrs   Electrolytes: replete as necessary, K>4, Mg>2  Nutrition: pureed   Bowel Regimen: Miralax  DVT ppx: Lovenox 40 qd  GI ppx: Pantoprazole 20mg home med  Code: Full  Disposition: medical floor.

## 2021-12-27 NOTE — CONSULT NOTE ADULT - SUBJECTIVE AND OBJECTIVE BOX
Patient is a 89y old  Female who presents with a chief complaint of      HPI:    89y F PMH Alzheimer's dementia with delusions, GREGORY, frequent falls, HTN, HLD, R femur fracture 2021 presented for post-fall evaluation from nursing facility. Per daughter had unwitnessed fall 11pm while getting out of bed, EMS was called due to concern for hand fracture. Multiple falls in the past including at this facility. Baseline mental status per NH is alert but not oriented.  Patient unable to provide history. Patient denied dysuria, productive cough. Non participatory in further questions.    In the ED:    - VS: Tmax: 102.8, HR: 114, BP: 160/120, RR: 15, O2: 95%     - Pertinent Labs: WBC 18.82. Neutroph% 88.3 %Lymphocyte 6.1. Procal 1.74. Hb 11.4. Alk phos 124.  COVID+    - Imaging: CXR: Lungs otherwise grossly clear. No pleural effusion or pneumothorax                   -XR hip: No acute fracture. XR L hand: No acute fracture.    - Treatment/interventions: Tylenol 650 x1  Ativan 0.5 x1  NS bolus 2L    PMHx: See above  PSHx: Femur repair   Meds: See med rec  Allergies: NKA  Social: see below     (25 Dec 2021 07:11)      PAST MEDICAL & SURGICAL HISTORY:  Dementia    HTN (hypertension)    Anxiety    Hyperlipidemia    Open femur fracture  May 2021        MEDICATIONS  (STANDING):  cefTRIAXone   IVPB 1000 milliGRAM(s) IV Intermittent every 24 hours  enoxaparin Injectable 40 milliGRAM(s) SubCutaneous every 24 hours  escitalopram 20 milliGRAM(s) Oral daily  melatonin 3 milliGRAM(s) Oral at bedtime  multivitamin 1 Tablet(s) Oral daily  multivitamin/minerals 1 Tablet(s) Oral daily  pantoprazole    Tablet 20 milliGRAM(s) Oral before breakfast  polyethylene glycol 3350 17 Gram(s) Oral daily  simvastatin 20 milliGRAM(s) Oral at bedtime    MEDICATIONS  (PRN):  acetaminophen     Tablet .. 650 milliGRAM(s) Oral every 6 hours PRN Temp greater or equal to 38C (100.4F), Mild Pain (1 - 3)  OLANZapine 2.5 milliGRAM(s) Oral at bedtime PRN aggitation        FAMILY HISTORY:      CBC Full  -  ( 26 Dec 2021 07:56 )  WBC Count : 10.81 K/uL  RBC Count : 3.91 M/uL  Hemoglobin : 11.2 g/dL  Hematocrit : 34.8 %  Platelet Count - Automated : 346 K/uL  Mean Cell Volume : 89.0 fl  Mean Cell Hemoglobin : 28.6 pg  Mean Cell Hemoglobin Concentration : 32.2 gm/dL  Auto Neutrophil # : 10.15 K/uL  Auto Lymphocyte # : 0.56 K/uL  Auto Monocyte # : 0.10 K/uL  Auto Eosinophil # : 0.00 K/uL  Auto Basophil # : 0.00 K/uL  Auto Neutrophil % : 93.9 %  Auto Lymphocyte % : 5.2 %  Auto Monocyte % : 0.9 %  Auto Eosinophil % : 0.0 %  Auto Basophil % : 0.0 %          143  |  109<H>  |  14  ----------------------------<  106<H>  3.6   |  25  |  0.65    Ca    9.2      26 Dec 2021 07:56  Phos  2.9       Mg     1.7             Urinalysis Basic - ( 26 Dec 2021 08:02 )    Color: Yellow / Appearance: Clear / S.020 / pH: x  Gluc: x / Ketone: Trace mg/dL  / Bili: Negative / Urobili: 0.2 E.U./dL   Blood: x / Protein: NEGATIVE mg/dL / Nitrite: NEGATIVE   Leuk Esterase: NEGATIVE / RBC: < 5 /HPF / WBC < 5 /HPF   Sq Epi: x / Non Sq Epi: 0-5 /HPF / Bacteria: Present /HPF          Radiology:    < from: Xray Chest 1 View-PORTABLE IMMEDIATE (Xray Chest 1 View-PORTABLE IMMEDIATE .) (21 @ 06:00) >  ACC: [id number] EXAM:  XR CHEST PORTABLE IMMED 1V                          PROCEDURE DATE:  2021          INTERPRETATION:  Portable chest    HISTORY: Fever trauma patient fell    IMPRESSION:    Cannot exclude retrocardiac infiltrate. Lungs otherwise grossly clear. No   pleural effusion or pneumothorax. Vascular calcification thoracic aorta.   No acute bone abnormality.        < from: Xray Hand 3 Views, Right (21 @ 06:11) >  ACC: [id number] EXAM:  XR HAND MIN 3 VIEWS RT                          PROCEDURE DATE:  2021          INTERPRETATION:  Right hand 3 views    HISTORY: Trauma patient fell    IMPRESSION:    Severe degenerative changes. No acute fracture identified.                    Vital Signs Last 24 Hrs  T(C): 37 (27 Dec 2021 06:35), Max: 37 (26 Dec 2021 21:24)  T(F): 98.6 (27 Dec 2021 06:35), Max: 98.6 (26 Dec 2021 21:24)  HR: 70 (27 Dec 2021 06:35) (59 - 72)  BP: 132/72 (27 Dec 2021 06:35) (122/72 - 132/72)  BP(mean): --  RR: 1 (27 Dec 2021 06:35) (1 - 19)  SpO2: 97% (27 Dec 2021 06:35) (97% - 98%)        REVIEW OF SYSTEMS: s/p fall at NH        Physical Exam:  on COVID isolation, in accordance with current standards limiting patient contact, please refer to exam performed on 2021    General. Anxious female responding to internal stimuli. Dry appearing  HEENT: Atraumatic head. PERRL. MMM  Respiratory: B/l crackles R>L  Cardiovascular: RRR, normal S1S2, no M/R/G  Gastrointestinal: soft, NTND, no masses palpable, BS normal. No RUQ TTP. No suprapubic tenderness or fullness.  Extremities: Warm, well perfused, pulses equal bilateral upper and lower extremities, no edema, no clubbing  Neurological: Not participatory. FROM extremities  Skin: Normal temperature, warm, dry. Bruising on shin. Skin tear of R forearm, L lower LE    PM&R Impression:    1) deconditioned  2) no focal weakness    Recommendations/ Plan :    1) Physical therapy focusing on therapeutic exercises, bed mobility/transfer out of bed evaluation, progressive ambulation with assistive devices prn.    2) Anticipated Disposition Plan/Recs:    return to NH

## 2021-12-27 NOTE — PHYSICAL THERAPY INITIAL EVALUATION ADULT - ADDITIONAL COMMENTS
Unable to obtain pt's social history due to pt's mental status, per chart pt admitted from [facility].

## 2021-12-27 NOTE — PROGRESS NOTE ADULT - PROBLEM SELECTOR PLAN 8
Collateral from NH: Alert but not oriented. Admission AAOx0. Meds at nursing home: olanzapine 2.5 at night, lexapro 20mg qd, ativan 0.5BID with 0.5 x1 PRN. Previously on haldol.   -Continue lexapro  -Continue olanzapine 2.5 at night PRN  -Holding ativan as precaution

## 2021-12-27 NOTE — PROGRESS NOTE ADULT - SUBJECTIVE AND OBJECTIVE BOX
SUBJECTIVE/OVERNIGHT EVENTS: No acute overnight events. Pt seen in AM at bedside, agitated, does not appear to be in any acute distress. Unable to participate in interview.     VITAL SIGNS:  Vital Signs Last 24 Hrs  T(C): 37.3 (27 Dec 2021 12:12), Max: 37.3 (27 Dec 2021 12:12)  T(F): 99.1 (27 Dec 2021 12:12), Max: 99.1 (27 Dec 2021 12:12)  HR: 66 (27 Dec 2021 12:12) (59 - 72)  BP: 128/72 (27 Dec 2021 12:12) (122/72 - 135/108)  BP(mean): --  RR: 16 (27 Dec 2021 12:12) (1 - 19)  SpO2: 99% (27 Dec 2021 12:12) (97% - 99%)    PHYSICAL EXAM:  General: NAD; one word answers, irritated   HEENT: NC/AT; PERRL; EOMI; MMM  Neck: supple; no JVD  Cardiac: RRR; +S1/S2  Pulm: minimal b/l crackles; no W/R/R  GI: soft, NT/ND, +BS  Extremities: WWP; no edema, clubbing or cyanosis; patient has bruising b/l on lower extremities   Vasc: 2+ radial, DP pulses B/L  Neuro: AAOx1; no focal deficits    MEDICATIONS:  MEDICATIONS  (STANDING):  cefTRIAXone   IVPB 1000 milliGRAM(s) IV Intermittent every 24 hours  enoxaparin Injectable 40 milliGRAM(s) SubCutaneous every 24 hours  escitalopram 20 milliGRAM(s) Oral daily  melatonin 3 milliGRAM(s) Oral at bedtime  multivitamin 1 Tablet(s) Oral daily  multivitamin/minerals 1 Tablet(s) Oral daily  pantoprazole    Tablet 20 milliGRAM(s) Oral before breakfast  polyethylene glycol 3350 17 Gram(s) Oral daily  simvastatin 20 milliGRAM(s) Oral at bedtime    MEDICATIONS  (PRN):  acetaminophen     Tablet .. 650 milliGRAM(s) Oral every 6 hours PRN Temp greater or equal to 38C (100.4F), Mild Pain (1 - 3)  OLANZapine 2.5 milliGRAM(s) Oral at bedtime PRN aggitation      ALLERGIES:  Allergies    No Known Allergies    Intolerances        LABS:                        12.5   11.52 )-----------( 407      ( 27 Dec 2021 11:34 )             41.2     12-27    145  |  107  |  9   ----------------------------<  103<H>  3.6   |  24  |  0.51    Ca    9.7      27 Dec 2021 11:34  Phos  3.2     12-27  Mg     2.2     12-27          RADIOLOGY & ADDITIONAL TESTS: Reviewed.

## 2021-12-27 NOTE — PROGRESS NOTE ADULT - PROBLEM SELECTOR PLAN 9
Discussed with daughter advanced directives. Patient has document that states no extraordinary measures if there is "no hope". Discussion re: chest compressions on her frail frame and risk of fractures, pneumothorax etc however wants to have compressions but be DNI      # nutrition  Patient on pureed diet  - f/u bedside evaluation  - s/p IVF D5W 1/2 NS   - f/u w family to assess baseline function of nutrition, altered mental status and physical limitations

## 2021-12-27 NOTE — PROGRESS NOTE ADULT - PROBLEM SELECTOR PLAN 2
Hx falls with prior admissions. Lives in nursing home. Unwitnessed fall. Atraumatic head on exam. TSH wnl. Can consider cardiac etiology, neuro, polypharmacy (standing benzodiazapine in elderly) in frail female, Toxic metabolic encephelopathy from Covid and CAP. PT rehab facility or [facility].   - Fall precautions, enhanced with sitter for dementia  - B12  354 WNL   - F/U CT head 12/25

## 2021-12-27 NOTE — PROGRESS NOTE ADULT - PROBLEM SELECTOR PLAN 1
3/4 SIRS (Fever, HR, leukocytosis) with suspected source covid. Lactate 1.5 wnl. Procal 1.74 Likely covid with superimposed bacterial pna (+procal). CXR grossly unremarkable. No suprapubic or RUQ tenderness. S/p 2L in ED. 5/2021 admission with UTI. mIVF given sepsis + VALENTINE mindful of overload risk i/s/o covid. Patient afebrile, normotensive, normal BP.    - CTX 1g x5 days for CAP  - See covid plan below   - UA negative  - BCx 12/25 NGTD  - f/u urine legionella

## 2021-12-27 NOTE — PHYSICAL THERAPY INITIAL EVALUATION ADULT - PERTINENT HX OF CURRENT PROBLEM, REHAB EVAL
89y F PMH Alzheimer's dementia with delusions, GREGORY, frequent falls, HTN, HLD, R femur fracture 5/2021 presented for post-fall evaluation from nursing facility. Per daughter had unwitnessed fall 11pm while getting out of bed, EMS was called due to concern for hand fracture. Multiple falls in the past including at this facility. Baseline mental status per NH is alert but not oriented.

## 2021-12-27 NOTE — PROGRESS NOTE ADULT - PROBLEM SELECTOR PLAN 11
Fluids:  D5 1/2 NS 40cc/hr x 24 hrs   Electrolytes: replete as necessary, K>4, Mg>2  Nutrition: pureed   Bowel Regimen: Miralax  DVT ppx: Lovenox 40 qd  GI ppx: Pantoprazole 20mg home med  Code: Full  Disposition: medical floor

## 2021-12-27 NOTE — PHYSICAL THERAPY INITIAL EVALUATION ADULT - GENERAL OBSERVATIONS, REHAB EVAL
Pt received supine with HOB elevated in NAD all lines in tact, call bell in reach, cleared for PT IE by KACI Michaels.

## 2021-12-28 ENCOUNTER — TRANSCRIPTION ENCOUNTER (OUTPATIENT)
Age: 86
End: 2021-12-28

## 2021-12-28 DIAGNOSIS — S62.109A FRACTURE OF UNSPECIFIED CARPAL BONE, UNSPECIFIED WRIST, INITIAL ENCOUNTER FOR CLOSED FRACTURE: ICD-10-CM

## 2021-12-28 LAB
ANION GAP SERPL CALC-SCNC: 10 MMOL/L — SIGNIFICANT CHANGE UP (ref 5–17)
BUN SERPL-MCNC: 14 MG/DL — SIGNIFICANT CHANGE UP (ref 7–23)
CALCIUM SERPL-MCNC: 9.7 MG/DL — SIGNIFICANT CHANGE UP (ref 8.4–10.5)
CHLORIDE SERPL-SCNC: 106 MMOL/L — SIGNIFICANT CHANGE UP (ref 96–108)
CO2 SERPL-SCNC: 27 MMOL/L — SIGNIFICANT CHANGE UP (ref 22–31)
CREAT SERPL-MCNC: 0.67 MG/DL — SIGNIFICANT CHANGE UP (ref 0.5–1.3)
CRP SERPL-MCNC: 43.9 MG/L — HIGH (ref 0–4)
D DIMER BLD IA.RAPID-MCNC: 377 NG/ML DDU — HIGH
FERRITIN SERPL-MCNC: 200 NG/ML — HIGH (ref 15–150)
GLUCOSE SERPL-MCNC: 89 MG/DL — SIGNIFICANT CHANGE UP (ref 70–99)
HCT VFR BLD CALC: 37.5 % — SIGNIFICANT CHANGE UP (ref 34.5–45)
HGB BLD-MCNC: 11.9 G/DL — SIGNIFICANT CHANGE UP (ref 11.5–15.5)
MAGNESIUM SERPL-MCNC: 2.1 MG/DL — SIGNIFICANT CHANGE UP (ref 1.6–2.6)
MCHC RBC-ENTMCNC: 27.9 PG — SIGNIFICANT CHANGE UP (ref 27–34)
MCHC RBC-ENTMCNC: 31.7 GM/DL — LOW (ref 32–36)
MCV RBC AUTO: 88 FL — SIGNIFICANT CHANGE UP (ref 80–100)
NRBC # BLD: 0 /100 WBCS — SIGNIFICANT CHANGE UP (ref 0–0)
PHOSPHATE SERPL-MCNC: 3.4 MG/DL — SIGNIFICANT CHANGE UP (ref 2.5–4.5)
PLATELET # BLD AUTO: 407 K/UL — HIGH (ref 150–400)
POTASSIUM SERPL-MCNC: 4 MMOL/L — SIGNIFICANT CHANGE UP (ref 3.5–5.3)
POTASSIUM SERPL-SCNC: 4 MMOL/L — SIGNIFICANT CHANGE UP (ref 3.5–5.3)
RBC # BLD: 4.26 M/UL — SIGNIFICANT CHANGE UP (ref 3.8–5.2)
RBC # FLD: 13.2 % — SIGNIFICANT CHANGE UP (ref 10.3–14.5)
SODIUM SERPL-SCNC: 143 MMOL/L — SIGNIFICANT CHANGE UP (ref 135–145)
WBC # BLD: 11.44 K/UL — HIGH (ref 3.8–10.5)
WBC # FLD AUTO: 11.44 K/UL — HIGH (ref 3.8–10.5)

## 2021-12-28 RX ORDER — ACETAMINOPHEN 500 MG
2 TABLET ORAL
Qty: 0 | Refills: 0 | DISCHARGE

## 2021-12-28 RX ORDER — ACETAMINOPHEN 500 MG
1 TABLET ORAL
Qty: 0 | Refills: 0 | DISCHARGE

## 2021-12-28 RX ORDER — ACETAMINOPHEN 500 MG
2 TABLET ORAL
Qty: 0 | Refills: 0 | DISCHARGE
Start: 2021-12-28

## 2021-12-28 RX ORDER — ACETAMINOPHEN 500 MG
1000 TABLET ORAL EVERY 8 HOURS
Refills: 0 | Status: DISCONTINUED | OUTPATIENT
Start: 2021-12-28 | End: 2021-12-29

## 2021-12-28 RX ORDER — TRAMADOL HYDROCHLORIDE 50 MG/1
1 TABLET ORAL
Qty: 0 | Refills: 0 | DISCHARGE

## 2021-12-28 RX ADMIN — PANTOPRAZOLE SODIUM 20 MILLIGRAM(S): 20 TABLET, DELAYED RELEASE ORAL at 07:07

## 2021-12-28 RX ADMIN — Medication 1 TABLET(S): at 11:19

## 2021-12-28 RX ADMIN — Medication 1000 MILLIGRAM(S): at 23:45

## 2021-12-28 RX ADMIN — CEFTRIAXONE 100 MILLIGRAM(S): 500 INJECTION, POWDER, FOR SOLUTION INTRAMUSCULAR; INTRAVENOUS at 17:50

## 2021-12-28 RX ADMIN — SIMVASTATIN 20 MILLIGRAM(S): 20 TABLET, FILM COATED ORAL at 22:50

## 2021-12-28 RX ADMIN — ENOXAPARIN SODIUM 40 MILLIGRAM(S): 100 INJECTION SUBCUTANEOUS at 11:19

## 2021-12-28 RX ADMIN — Medication 1000 MILLIGRAM(S): at 14:39

## 2021-12-28 RX ADMIN — Medication 3 MILLIGRAM(S): at 22:49

## 2021-12-28 RX ADMIN — ESCITALOPRAM OXALATE 20 MILLIGRAM(S): 10 TABLET, FILM COATED ORAL at 11:19

## 2021-12-28 RX ADMIN — Medication 1000 MILLIGRAM(S): at 22:50

## 2021-12-28 RX ADMIN — POLYETHYLENE GLYCOL 3350 17 GRAM(S): 17 POWDER, FOR SOLUTION ORAL at 11:20

## 2021-12-28 RX ADMIN — Medication 1000 MILLIGRAM(S): at 13:39

## 2021-12-28 RX ADMIN — Medication 1 TABLET(S): at 11:20

## 2021-12-28 RX ADMIN — OLANZAPINE 2.5 MILLIGRAM(S): 15 TABLET, FILM COATED ORAL at 23:23

## 2021-12-28 NOTE — PROGRESS NOTE ADULT - SUBJECTIVE AND OBJECTIVE BOX
SUBJECTIVE/OVERNIGHT EVENTS: Had PRN overn Pt seen in AM at bedside, resting comfortably in bed, and does not appear to be in any acute distress. When asked, pt denies any recent or active fever, chills, nausea, vomiting, headache, acute sob, chest pain, abdominal pain, genitourinary sx, extremity pain or swelling.    VITAL SIGNS:  Vital Signs Last 24 Hrs  T(C): 37 (28 Dec 2021 12:59), Max: 37 (28 Dec 2021 12:59)  T(F): 98.6 (28 Dec 2021 12:59), Max: 98.6 (28 Dec 2021 12:59)  HR: 78 (28 Dec 2021 12:59) (72 - 91)  BP: 160/66 (28 Dec 2021 12:59) (125/72 - 160/66)  BP(mean): --  RR: 19 (28 Dec 2021 12:59) (16 - 19)  SpO2: 95% (28 Dec 2021 12:59) (95% - 96%)    PHYSICAL EXAM:  General: NAD; speaking in full sentences  HEENT: NC/AT; PERRL; EOMI; MMM  Neck: supple; no JVD  Cardiac: RRR; +S1/S2  Pulm: CTA B/L; no W/R/R  GI: soft, NT/ND, +BS  Extremities: WWP; no edema, clubbing or cyanosis  Vasc: 2+ radial, DP pulses B/L  Neuro: AAOx3; no focal deficits    MEDICATIONS:  MEDICATIONS  (STANDING):  acetaminophen     Tablet .. 1000 milliGRAM(s) Oral every 8 hours  cefTRIAXone   IVPB 1000 milliGRAM(s) IV Intermittent every 24 hours  enoxaparin Injectable 40 milliGRAM(s) SubCutaneous every 24 hours  escitalopram 20 milliGRAM(s) Oral daily  melatonin 3 milliGRAM(s) Oral at bedtime  multivitamin 1 Tablet(s) Oral daily  multivitamin/minerals 1 Tablet(s) Oral daily  pantoprazole    Tablet 20 milliGRAM(s) Oral before breakfast  polyethylene glycol 3350 17 Gram(s) Oral daily  simvastatin 20 milliGRAM(s) Oral at bedtime    MEDICATIONS  (PRN):  OLANZapine 2.5 milliGRAM(s) Oral at bedtime PRN aggitation      ALLERGIES:  Allergies    No Known Allergies    Intolerances        LABS:                        11.9   11.44 )-----------( 407      ( 28 Dec 2021 05:52 )             37.5     12-28    143  |  106  |  14  ----------------------------<  89  4.0   |  27  |  0.67    Ca    9.7      28 Dec 2021 05:52  Phos  3.4     12-28  Mg     2.1     12-28          RADIOLOGY & ADDITIONAL TESTS: Reviewed. SUBJECTIVE/OVERNIGHT EVENTS: Had PRN zyprexa for agitation. Pt seen in AM at bedside, agitated in bed asking about people not in the room, possible visual hallucination. Removed bandages from arms.     VITAL SIGNS:  Vital Signs Last 24 Hrs  T(C): 37 (28 Dec 2021 12:59), Max: 37 (28 Dec 2021 12:59)  T(F): 98.6 (28 Dec 2021 12:59), Max: 98.6 (28 Dec 2021 12:59)  HR: 78 (28 Dec 2021 12:59) (72 - 91)  BP: 160/66 (28 Dec 2021 12:59) (125/72 - 160/66)  BP(mean): --  RR: 19 (28 Dec 2021 12:59) (16 - 19)  SpO2: 95% (28 Dec 2021 12:59) (95% - 96%)    PHYSICAL EXAM:  General: frail elderly woman, agitated, hallucinations  HEENT: NC/AT; PERRL; EOMI; MMM  Neck: supple; no JVD  Cardiac: RRR; +S1/S2  Pulm: minimal b/l crackles; no W/R/R  GI: soft, NT/ND, +BS  Extremities: WWP; no edema, clubbing or cyanosis; patient has bruising b/l on lower extremities, scabbed lesion on RUE  Vasc: 2+ radial, DP pulses B/L  Neuro: AAOx1; no focal deficits      MEDICATIONS:  MEDICATIONS  (STANDING):  acetaminophen     Tablet .. 1000 milliGRAM(s) Oral every 8 hours  cefTRIAXone   IVPB 1000 milliGRAM(s) IV Intermittent every 24 hours  enoxaparin Injectable 40 milliGRAM(s) SubCutaneous every 24 hours  escitalopram 20 milliGRAM(s) Oral daily  melatonin 3 milliGRAM(s) Oral at bedtime  multivitamin 1 Tablet(s) Oral daily  multivitamin/minerals 1 Tablet(s) Oral daily  pantoprazole    Tablet 20 milliGRAM(s) Oral before breakfast  polyethylene glycol 3350 17 Gram(s) Oral daily  simvastatin 20 milliGRAM(s) Oral at bedtime    MEDICATIONS  (PRN):  OLANZapine 2.5 milliGRAM(s) Oral at bedtime PRN aggitation      ALLERGIES:  Allergies    No Known Allergies    Intolerances        LABS:                        11.9   11.44 )-----------( 407      ( 28 Dec 2021 05:52 )             37.5     12-28    143  |  106  |  14  ----------------------------<  89  4.0   |  27  |  0.67    Ca    9.7      28 Dec 2021 05:52  Phos  3.4     12-28  Mg     2.1     12-28          RADIOLOGY & ADDITIONAL TESTS: Reviewed.

## 2021-12-28 NOTE — DISCHARGE NOTE PROVIDER - NSDCCPCAREPLAN_GEN_ALL_CORE_FT
PRINCIPAL DISCHARGE DIAGNOSIS  Diagnosis: Multiple falls  Assessment and Plan of Treatment: You have been evaluated after a fall. This fall was not witnessed however there was concern for an fracture of the wrist. You have been found to have a triquetral fracture.         SECONDARY DISCHARGE DIAGNOSES  Diagnosis: 2019 novel coronavirus disease (COVID-19)  Assessment and Plan of Treatment: You were diagnosed with the new COVID-19 coronavirus infection via a nasal swab. The treatment for this infection is supportive care, which includes: rest, maintaining adequate oral intake of food and water, and taking acetaminophen/tylenol for fever. Please maintain a strict home quarantine for 5 days at home, and wear a surgical mask at all times when you need to be in close proximity with another human being.   Take tylenol as needed, every 6 hours, with a maximum daily dose of 4,000 mg a day. Please visit your nearest urgent care or emergency department should you start to experience: severe shortness of breath, severe cough/wheezing/difficulty breathing, or fever >103 for 3 days. Please maintain a good healthy diet. If you have any questions, please call your primary care provider.       PRINCIPAL DISCHARGE DIAGNOSIS  Diagnosis: Multiple falls  Assessment and Plan of Treatment: You have been evaluated after a fall. This fall was not witnessed however there was concern for an fracture of the wrist. You have been found to have a triquetral fracture. We recommend a splint be placed to your wrist and you follow up outpatient with Ortho and your PCP. Continue Tylenol as needed for pain management.         SECONDARY DISCHARGE DIAGNOSES  Diagnosis: 2019 novel coronavirus disease (COVID-19)  Assessment and Plan of Treatment: You were diagnosed with the new COVID-19 coronavirus infection via a nasal swab. The treatment for this infection is supportive care, which includes: rest, maintaining adequate oral intake of food and water, and taking acetaminophen/tylenol for fever. Please maintain a strict home quarantine for 5 days at home, and wear a surgical mask at all times when you need to be in close proximity with another human being.   Take tylenol as needed, every 6 hours, with a maximum daily dose of 4,000 mg a day. Please visit your nearest urgent care or emergency department should you start to experience: severe shortness of breath, severe cough/wheezing/difficulty breathing, or fever >103 for 3 days. Please maintain a good healthy diet. If you have any questions, please call your primary care provider.      Diagnosis: Hypertension  Assessment and Plan of Treatment: While admitted, your blood pressure was found to be elevated, with your systolic in the 160-180s. Initially, we attributed the high blood pressure to be due to pain and anxiety, however, you continued to have high blood pressure. We started you on Amlodipine 5mg daily to help control your blood pressure. Please follow up with your primary care doctor within one week for further monitoring and management of your high blood pressure.

## 2021-12-28 NOTE — PROGRESS NOTE ADULT - NUTRITIONAL ASSESSMENT
This patient has been assessed with a concern for Malnutrition and has been determined to have a diagnosis/diagnoses of Mild protein-calorie malnutrition and Underweight (BMI < 19).    This patient is being managed with:   Diet Pureed-  Supplement Feeding Modality:  Oral  Ensure Enlive Cans or Servings Per Day:  1       Frequency:  Three Times a day  Entered: Dec 26 2021 12:25PM    Diet Pureed-  Entered: Dec 25 2021  4:39PM    The following pending diet order is being considered for treatment of Mild protein-calorie malnutrition and Underweight (BMI < 19):null
This patient has been assessed with a concern for Malnutrition and has been determined to have a diagnosis/diagnoses of Mild protein-calorie malnutrition and Underweight (BMI < 19).    This patient is being managed with:   Diet Pureed-  Supplement Feeding Modality:  Oral  Ensure Enlive Cans or Servings Per Day:  1       Frequency:  Three Times a day  Entered: Dec 26 2021 12:25PM    Diet Pureed-  Entered: Dec 25 2021  4:39PM    The following pending diet order is being considered for treatment of Mild protein-calorie malnutrition and Underweight (BMI < 19):null

## 2021-12-28 NOTE — DISCHARGE NOTE PROVIDER - NSDCHHNEEDSERVICE_GEN_ALL_CORE
with patient Medication teaching and assessment/Observation and assessment/Rehabilitation services/Teaching and training

## 2021-12-28 NOTE — PROGRESS NOTE ADULT - PROBLEM SELECTOR PLAN 7
Home med: Simvastatin 20mg  -Continue home med
Admission 160/100. Likely i/s/o of anxiety and agitation. Does not take anti HTN meds. RESOLVED
Home med: Simvastatin 20mg  -Continue home med

## 2021-12-28 NOTE — PROGRESS NOTE ADULT - PROBLEM SELECTOR PLAN 3
Known exposure at nursing home. Saturating well on room air, not in respiratory distress. CXR grossly clear  -Does not meet criteria for remdesivir or decadron  -Monitor oxygen status  -Acetaminophen 650 q6prn for fever
Known exposure at nursing home. Saturating well on room air, not in respiratory distress. CXR grossly clear.    -Does not meet criteria for remdesivir or decadron  -Monitor oxygen status
Known exposure at nursing home. Saturating well on room air, not in respiratory distress. CXR grossly clear  -Does not meet criteria for remdesivir or decadron  -Monitor oxygen status  -Acetaminophen 650 q6prn for fever

## 2021-12-28 NOTE — PROGRESS NOTE ADULT - PROBLEM SELECTOR PLAN 2
Hx falls with prior admissions. Lives in nursing home. Unwitnessed fall. Atraumatic head on exam. TSH wnl. Can consider cardiac etiology, neuro, polypharmacy (standing benzodiazapine in elderly) in frail female, Toxic metabolic encephelopathy from Covid and CAP. PT rehab facility or [facility]. CT head cancelled as patient is mentating at baseline, no evidence of focal neuro deficits.     - Fall precautions, enhanced with sitter for dementia  - R Wrist XR with Triquetral fracture

## 2021-12-28 NOTE — PROGRESS NOTE ADULT - PROBLEM SELECTOR PLAN 4
R Wrist XR with Triquetral fracture.     - short splint for immobilization  - tylenol 1000 mg q8hrs for pain

## 2021-12-28 NOTE — DISCHARGE NOTE PROVIDER - NSDCMRMEDTOKEN_GEN_ALL_CORE_FT
clobetasol 0.05% topical cream: Apply topically to affected area 2 times a day  Lexapro 20 mg oral tablet: 1 tab(s) orally once a day  Multiple Vitamins with Minerals oral tablet: 1 tab(s) orally once a day  OLANZapine 2.5 mg oral tablet: 1 tab(s) orally once a day (at bedtime)  pantoprazole 20 mg oral delayed release tablet: 1 tab(s) orally once a day  simvastatin 20 mg oral tablet: 1 tab(s) orally once a day (at bedtime)  Tylenol 325 mg oral tablet: 2 tab(s) orally every 6 hours, As needed, Temp greater or equal to 38C (100.4F), Mild Pain (1 - 3)   acetaminophen 500 mg oral tablet: 2 tab(s) orally every 8 hours  amLODIPine 5 mg oral tablet: 1 tab(s) orally once a day  clobetasol 0.05% topical cream: Apply topically to affected area 2 times a day  Lexapro 20 mg oral tablet: 1 tab(s) orally once a day  Multiple Vitamins with Minerals oral tablet: 1 tab(s) orally once a day  OLANZapine 2.5 mg oral tablet: 1 tab(s) orally once a day (at bedtime)  pantoprazole 20 mg oral delayed release tablet: 1 tab(s) orally once a day  simvastatin 20 mg oral tablet: 1 tab(s) orally once a day (at bedtime)

## 2021-12-28 NOTE — PROGRESS NOTE ADULT - PROBLEM SELECTOR PLAN 1
Patient with 3/4 SIRS (Fever, HR, leukocytosis) on presentation. Positive for COVID with likely superimposed bacterial pneumonia. Lactate negative. Procal 1.74. CXR grossly unremarkable. Given ceftriaxone 1 g q daily for 5 day course (12/25-12/29). BCx 12/25 NGTD.    - CTX 1g x5 days for CAP  - See covid plan below   - UA negative  - BCx 12/25 NGTD

## 2021-12-28 NOTE — DISCHARGE NOTE PROVIDER - HOSPITAL COURSE
#Discharge: do not delete    Patient is 89y F PMH dementia with episodic psychosis, frequent falls, depression/anxiety, HTN, HLD, h/o R femur 5/2021 with known COVID exposure at nursing home COVID+ without hypoxia admitted for post-fall evaluation, found to be septic with COVID and CAP  pneumonia.     Discharge to: nursing home     Problem List/Main Diagnoses:       #Sepsis 2/2 to COVID and cap pneumonia  Patient with 3/4 SIRS (Fever, HR, leukocytosis) on presentation. Positive for COVID with likely superimposed bacterial pneumonia. Lactate negative. Procal 1.74. CXR grossly unremarkable. Given ceftriaxone 1 g q daily for 5 day course (12/25-12/29). BCx 12/25 NGTD  - c/w cefpodoxime 200 mg BID (12/25-12/29)  - See covid plan below     #2019 novel coronavirus disease (COVID-19).   Known exposure at nursing home. Saturating well on room air, not in respiratory distress. CXR grossly clear. Does not meet criteria for remdesivir or decadron.  - Quarantine x 5 days after positive test (12/25-12/30)    #Fall with Triquetral fracture.  Lives in nursing home had unwitnessed fall. Atraumatic head on exam. TSH, B12 WNL. Benzodiazapine' s d/delfina. Consider toxic metabolic encephelopathy from Covid and CAP. PT evaluated patient. XR R wrist positive for triquetral fracture.   - c/w fall precautions    #VALENTINE (acute kidney injury).   Admission Cr 0.86,  0.55 at baseline. Given fluids. Resolved.   - no further intervention    #Anemia.   Patient presenting with Hb 11.4, MCV 86.7. Resolved.     #Hypertension.   Admission 160/100. Likely i/s/o of anxiety and agitation. Does not take anti HTN meds. Resolved.    #Hyperlipemia.   Home med: Simvastatin 20mg  -Continue home Simvastatin 20mg    #Severe dementia.   Collateral from NH: Alert but not oriented. Admission AAOx0. Meds at nursing home: olanzapine 2.5 at night, lexapro 20mg qd, ativan 0.5BID with 0.5 x1 PRN. Previously on haldol.   - Continue  lexapro 20mg qd  - Continue olanzapine 2.5 at night PRN  - Holding ativan in setting of fall      New medications/therapies: cefpodoxime 200 mg BID (12/25-12/29)         #Discharge: do not delete    Patient is 89y F PMH dementia with episodic psychosis, frequent falls, depression/anxiety, HTN, HLD, h/o R femur 5/2021 with known COVID exposure at nursing home COVID+ without hypoxia admitted for post-fall evaluation, found to be septic with COVID and CAP  pneumonia, as well as R triquetral fracture.    Discharge to: [facility]    Problem List/Main Diagnoses:     #Sepsis 2/2 to COVID and CAP pneumonia  Patient with 3/4 SIRS (Fever, HR, leukocytosis) on presentation. Positive for COVID with likely superimposed bacterial pneumonia. Lactate negative. Procal 1.74. CXR grossly unremarkable. Given ceftriaxone 1 g q daily for 5 day course (12/25-12/29). BCx 12/25 NGTD  - See covid plan below     #2019 novel coronavirus disease (COVID-19).   Known exposure at nursing home. Saturating well on room air, not in respiratory distress. CXR grossly clear. Does not meet criteria for remdesivir or decadron.  - Quarantine until 1/2 (+ on 12/25)    #Fall with Triquetral fracture.  Lives in nursing home had unwitnessed fall. Atraumatic head on exam. TSH, B12 WNL. Benzodiazapine' s d/delfina. Consider toxic metabolic encephelopathy from Covid and CAP. PT evaluated patient. XR R wrist positive for triquetral fracture  -pain currently being managed with Tylenol  -recommend splint and outpatient follow up with Ortho and PMD  - c/w fall precautions    #HTN  -patient initially 160s systolic on admission, attributed to pain and agitation  -on 12/29, BP found to be 179/76 with repeat of 185/82. Patient does not appear to be in pain or agitated  -started on Amlodipine 5mg qD and recommend outpatient follow up    #VALENTINE (acute kidney injury).   Admission Cr 0.86,  0.55 at baseline. Given fluids. Resolved.   - no further intervention    #Anemia.   Patient presenting with Hb 11.4, MCV 86.7. Resolved.     #Hyperlipemia.   Home med: Simvastatin 20mg  -Continue home Simvastatin 20mg    #Severe dementia.   Collateral from NH: Alert but not oriented. Admission AAOx0. Meds at nursing home: olanzapine 2.5 at night, lexapro 20mg qd, ativan 0.5BID with 0.5 x1 PRN. Previously on haldol.   - Continue  lexapro 20mg qd  - Continue olanzapine 2.5 at night PRN  - Holding ativan in setting of fall    New medications/therapies: cefpodoxime Amlodipine 5mg qD         #Discharge: do not delete    Patient is 89y F PMH dementia with episodic psychosis, frequent falls, depression/anxiety, HTN, HLD, h/o R femur 5/2021 with known COVID exposure at nursing home COVID+ without hypoxia admitted for post-fall evaluation, found to be septic with COVID and CAP  pneumonia, as well as R triquetral fracture.    Discharge to: assisted living facility    Problem List/Main Diagnoses:     #Sepsis 2/2 to COVID and CAP pneumonia  Patient with 3/4 SIRS (Fever, HR, leukocytosis) on presentation. Positive for COVID with likely superimposed bacterial pneumonia. Lactate negative. Procal 1.74. CXR grossly unremarkable. Given ceftriaxone 1 g q daily for 5 day course (12/25-12/29). BCx 12/25 NGTD  - See covid plan below     #2019 novel coronavirus disease (COVID-19).   Known exposure at nursing home. Saturating well on room air, not in respiratory distress. CXR grossly clear. Does not meet criteria for remdesivir or decadron.  - Quarantine until 1/2 (+ on 12/25)    #Fall with Triquetral fracture.  Lives in nursing home had unwitnessed fall. Atraumatic head on exam. TSH, B12 WNL. Benzodiazapine' s d/delfina. Consider toxic metabolic encephelopathy from Covid and CAP. PT evaluated patient. XR R wrist positive for triquetral fracture  -pain currently being managed with Tylenol  -recommend splint and outpatient follow up with Ortho and PMD  - c/w fall precautions    #HTN  -patient initially 160s systolic on admission, attributed to pain and agitation  -on 12/29, BP found to be 179/76 with repeat of 185/82. Patient does not appear to be in pain or agitated  -started on Amlodipine 5mg qD and recommend outpatient follow up    #VALENTINE (acute kidney injury).   Admission Cr 0.86,  0.55 at baseline. Given fluids. Resolved.   - no further intervention    #Anemia.   Patient presenting with Hb 11.4, MCV 86.7. Resolved.     #Hyperlipemia.   Home med: Simvastatin 20mg  -Continue home Simvastatin 20mg    #Severe dementia.   Collateral from NH: Alert but not oriented. Admission AAOx0. Meds at nursing home: olanzapine 2.5 at night, lexapro 20mg qd, ativan 0.5BID with 0.5 x1 PRN. Previously on haldol.   - Continue  lexapro 20mg qd  - Continue olanzapine 2.5 at night PRN  - Holding ativan in setting of fall    New medications/therapies: cefpodoxime Amlodipine 5mg qD

## 2021-12-28 NOTE — PROGRESS NOTE ADULT - PROBLEM SELECTOR PLAN 6
Admission 160/100. Likely i/s/o of anxiety and agitation. Does not take anti HTN meds  -No acute interventions
Hb 11.4 MCV 86.7 Unclear baseline, 5/2021 14.7. Tachycardia resolved HD stable. Dry on exam, may be i/s/o poor PO intake. RESOLVED
Admission 160/100. Likely i/s/o of anxiety and agitation. Does not take anti HTN meds  -No acute interventions

## 2021-12-28 NOTE — PROGRESS NOTE ADULT - PROBLEM SELECTOR PLAN 10
BMI 18.1, frail elderly female  -Encourage PO intake  -Nutrition consult
BMI 18.1, frail elderly female  -Encourage PO intake  -Nutrition consult
Discussed with daughter advanced directives. Patient has document that states no extraordinary measures if there is "no hope". Discussion re: chest compressions on her frail frame and risk of fractures, pneumothorax etc however wants to have compressions but be DNI      # nutrition  Patient on pureed diet  - f/u w family to assess baseline function of nutrition, altered mental status and physical limitations

## 2021-12-28 NOTE — PROGRESS NOTE ADULT - ATTENDING COMMENTS
Pt with dementia with agitation, covid without Hypoxia is medically cleared for d/c  cont quarantine
Discharge planning to [facility]  meds reconciled  avoid benzos and opiods which pt was on at her facility
sepsis resolved and metal status better  get collateral info regrading pt's baseline and consider neuro w/u if needed  covid: not Hypoxic  PT eval and discharge planning

## 2021-12-28 NOTE — DISCHARGE NOTE PROVIDER - DETAILS OF MALNUTRITION DIAGNOSIS/DIAGNOSES
This patient has been assessed with a concern for Malnutrition and was treated during this hospitalization for the following Nutrition diagnosis/diagnoses:     -  12/26/2021: Mild protein-calorie malnutrition   -  12/26/2021: Underweight (BMI < 19)

## 2021-12-29 ENCOUNTER — TRANSCRIPTION ENCOUNTER (OUTPATIENT)
Age: 86
End: 2021-12-29

## 2021-12-29 VITALS
OXYGEN SATURATION: 95 % | HEART RATE: 63 BPM | DIASTOLIC BLOOD PRESSURE: 69 MMHG | RESPIRATION RATE: 17 BRPM | TEMPERATURE: 99 F | SYSTOLIC BLOOD PRESSURE: 190 MMHG

## 2021-12-29 PROCEDURE — 84145 PROCALCITONIN (PCT): CPT

## 2021-12-29 PROCEDURE — 80053 COMPREHEN METABOLIC PANEL: CPT

## 2021-12-29 PROCEDURE — 84484 ASSAY OF TROPONIN QUANT: CPT

## 2021-12-29 PROCEDURE — 82607 VITAMIN B-12: CPT

## 2021-12-29 PROCEDURE — 85027 COMPLETE CBC AUTOMATED: CPT

## 2021-12-29 PROCEDURE — 73110 X-RAY EXAM OF WRIST: CPT

## 2021-12-29 PROCEDURE — 86140 C-REACTIVE PROTEIN: CPT

## 2021-12-29 PROCEDURE — 86900 BLOOD TYPING SEROLOGIC ABO: CPT

## 2021-12-29 PROCEDURE — 87040 BLOOD CULTURE FOR BACTERIA: CPT

## 2021-12-29 PROCEDURE — 85379 FIBRIN DEGRADATION QUANT: CPT

## 2021-12-29 PROCEDURE — 84100 ASSAY OF PHOSPHORUS: CPT

## 2021-12-29 PROCEDURE — 99285 EMERGENCY DEPT VISIT HI MDM: CPT

## 2021-12-29 PROCEDURE — 96375 TX/PRO/DX INJ NEW DRUG ADDON: CPT

## 2021-12-29 PROCEDURE — 86901 BLOOD TYPING SEROLOGIC RH(D): CPT

## 2021-12-29 PROCEDURE — 85730 THROMBOPLASTIN TIME PARTIAL: CPT

## 2021-12-29 PROCEDURE — 80048 BASIC METABOLIC PNL TOTAL CA: CPT

## 2021-12-29 PROCEDURE — 97116 GAIT TRAINING THERAPY: CPT

## 2021-12-29 PROCEDURE — 84443 ASSAY THYROID STIM HORMONE: CPT

## 2021-12-29 PROCEDURE — 71045 X-RAY EXAM CHEST 1 VIEW: CPT

## 2021-12-29 PROCEDURE — 86780 TREPONEMA PALLIDUM: CPT

## 2021-12-29 PROCEDURE — 97161 PT EVAL LOW COMPLEX 20 MIN: CPT

## 2021-12-29 PROCEDURE — 83605 ASSAY OF LACTIC ACID: CPT

## 2021-12-29 PROCEDURE — 99238 HOSP IP/OBS DSCHRG MGMT 30/<: CPT

## 2021-12-29 PROCEDURE — 81001 URINALYSIS AUTO W/SCOPE: CPT

## 2021-12-29 PROCEDURE — 83735 ASSAY OF MAGNESIUM: CPT

## 2021-12-29 PROCEDURE — 82746 ASSAY OF FOLIC ACID SERUM: CPT

## 2021-12-29 PROCEDURE — 96365 THER/PROPH/DIAG IV INF INIT: CPT

## 2021-12-29 PROCEDURE — 85025 COMPLETE CBC W/AUTO DIFF WBC: CPT

## 2021-12-29 PROCEDURE — 97530 THERAPEUTIC ACTIVITIES: CPT

## 2021-12-29 PROCEDURE — 73521 X-RAY EXAM HIPS BI 2 VIEWS: CPT

## 2021-12-29 PROCEDURE — 85610 PROTHROMBIN TIME: CPT

## 2021-12-29 PROCEDURE — 87635 SARS-COV-2 COVID-19 AMP PRB: CPT

## 2021-12-29 PROCEDURE — 86850 RBC ANTIBODY SCREEN: CPT

## 2021-12-29 PROCEDURE — 36415 COLL VENOUS BLD VENIPUNCTURE: CPT

## 2021-12-29 PROCEDURE — 73130 X-RAY EXAM OF HAND: CPT

## 2021-12-29 PROCEDURE — 82728 ASSAY OF FERRITIN: CPT

## 2021-12-29 RX ORDER — ESCITALOPRAM OXALATE 10 MG/1
1 TABLET, FILM COATED ORAL
Qty: 3 | Refills: 0
Start: 2021-12-29 | End: 2021-12-31

## 2021-12-29 RX ORDER — AMLODIPINE BESYLATE 2.5 MG/1
5 TABLET ORAL DAILY
Refills: 0 | Status: DISCONTINUED | OUTPATIENT
Start: 2021-12-29 | End: 2021-12-29

## 2021-12-29 RX ORDER — OLANZAPINE 15 MG/1
1 TABLET, FILM COATED ORAL
Qty: 0 | Refills: 0 | DISCHARGE

## 2021-12-29 RX ORDER — MULTIVIT-MIN/FERROUS GLUCONATE 9 MG/15 ML
1 LIQUID (ML) ORAL
Qty: 3 | Refills: 0
Start: 2021-12-29 | End: 2021-12-31

## 2021-12-29 RX ORDER — ACETAMINOPHEN 500 MG
2 TABLET ORAL
Qty: 0 | Refills: 0 | DISCHARGE
Start: 2021-12-29

## 2021-12-29 RX ORDER — AMLODIPINE BESYLATE 2.5 MG/1
1 TABLET ORAL
Qty: 0 | Refills: 0 | DISCHARGE
Start: 2021-12-29

## 2021-12-29 RX ORDER — ACETAMINOPHEN 500 MG
2 TABLET ORAL
Qty: 18 | Refills: 0
Start: 2021-12-29 | End: 2021-12-31

## 2021-12-29 RX ORDER — OLANZAPINE 15 MG/1
1 TABLET, FILM COATED ORAL
Qty: 3 | Refills: 0
Start: 2021-12-29 | End: 2021-12-31

## 2021-12-29 RX ORDER — SIMVASTATIN 20 MG/1
1 TABLET, FILM COATED ORAL
Qty: 3 | Refills: 0
Start: 2021-12-29 | End: 2021-12-31

## 2021-12-29 RX ORDER — AMLODIPINE BESYLATE 2.5 MG/1
1 TABLET ORAL
Qty: 3 | Refills: 0
Start: 2021-12-29 | End: 2021-12-31

## 2021-12-29 RX ORDER — SIMVASTATIN 20 MG/1
1 TABLET, FILM COATED ORAL
Qty: 0 | Refills: 0 | DISCHARGE

## 2021-12-29 RX ORDER — ESCITALOPRAM OXALATE 10 MG/1
1 TABLET, FILM COATED ORAL
Qty: 0 | Refills: 0 | DISCHARGE

## 2021-12-29 RX ORDER — PANTOPRAZOLE SODIUM 20 MG/1
1 TABLET, DELAYED RELEASE ORAL
Qty: 0 | Refills: 0 | DISCHARGE

## 2021-12-29 RX ORDER — PANTOPRAZOLE SODIUM 20 MG/1
1 TABLET, DELAYED RELEASE ORAL
Qty: 3 | Refills: 0
Start: 2021-12-29 | End: 2021-12-31

## 2021-12-29 RX ADMIN — POLYETHYLENE GLYCOL 3350 17 GRAM(S): 17 POWDER, FOR SOLUTION ORAL at 11:10

## 2021-12-29 RX ADMIN — Medication 1 TABLET(S): at 11:09

## 2021-12-29 RX ADMIN — AMLODIPINE BESYLATE 5 MILLIGRAM(S): 2.5 TABLET ORAL at 11:10

## 2021-12-29 RX ADMIN — Medication 1000 MILLIGRAM(S): at 14:29

## 2021-12-29 RX ADMIN — Medication 1 TABLET(S): at 11:10

## 2021-12-29 RX ADMIN — Medication 1000 MILLIGRAM(S): at 06:35

## 2021-12-29 RX ADMIN — ENOXAPARIN SODIUM 40 MILLIGRAM(S): 100 INJECTION SUBCUTANEOUS at 11:09

## 2021-12-29 RX ADMIN — ESCITALOPRAM OXALATE 20 MILLIGRAM(S): 10 TABLET, FILM COATED ORAL at 11:09

## 2021-12-29 RX ADMIN — PANTOPRAZOLE SODIUM 20 MILLIGRAM(S): 20 TABLET, DELAYED RELEASE ORAL at 06:36

## 2021-12-29 RX ADMIN — Medication 1000 MILLIGRAM(S): at 07:35

## 2021-12-29 RX ADMIN — Medication 1000 MILLIGRAM(S): at 13:29

## 2021-12-29 NOTE — DISCHARGE NOTE NURSING/CASE MANAGEMENT/SOCIAL WORK - PATIENT PORTAL LINK FT
You can access the FollowMyHealth Patient Portal offered by [facility] by registering at the following website: [email or web address]. By joining [facility]’s FollowMyHealth portal, you will also be able to view your health information using other applications (apps) compatible with our system.

## 2021-12-29 NOTE — PROGRESS NOTE ADULT - SUBJECTIVE AND OBJECTIVE BOX
Physical Medicine and Rehabilitation Progress Note:    Patient is a 89y old  Female who presents with a chief complaint of fall (28 Dec 2021 11:56)      HPI:    89y F PMH Alzheimer's dementia with delusions, GREGORY, frequent falls, HTN, HLD, R femur fracture 5/2021 presented for post-fall evaluation from nursing facility. Per daughter had unwitnessed fall 11pm while getting out of bed, EMS was called due to concern for hand fracture. Multiple falls in the past including at this facility. Baseline mental status per NH is alert but not oriented.  Patient unable to provide history. Patient denied dysuria, productive cough. Non participatory in further questions.    In the ED:    - VS: Tmax: 102.8, HR: 114, BP: 160/120, RR: 15, O2: 95%     - Pertinent Labs: WBC 18.82. Neutroph% 88.3 %Lymphocyte 6.1. Procal 1.74. Hb 11.4. Alk phos 124.  COVID+    - Imaging: CXR: Lungs otherwise grossly clear. No pleural effusion or pneumothorax                   -XR hip: No acute fracture. XR L hand: No acute fracture.    - Treatment/interventions: Tylenol 650 x1  Ativan 0.5 x1  NS bolus 2L    PMHx: See above  PSHx: Femur repair 2021  Meds: See med rec  Allergies: NKA  Social: see below     (25 Dec 2021 07:11)                            11.9   11.44 )-----------( 407      ( 28 Dec 2021 05:52 )             37.5       12-28    143  |  106  |  14  ----------------------------<  89  4.0   |  27  |  0.67    Ca    9.7      28 Dec 2021 05:52  Phos  3.4     12-28  Mg     2.1     12-28      Vital Signs Last 24 Hrs  T(C): 36.3 (29 Dec 2021 05:27), Max: 37 (28 Dec 2021 12:59)  T(F): 97.4 (29 Dec 2021 05:27), Max: 98.6 (28 Dec 2021 12:59)  HR: 63 (29 Dec 2021 07:06) (63 - 88)  BP: 185/82 (29 Dec 2021 07:06) (160/66 - 185/82)  BP(mean): --  RR: 18 (29 Dec 2021 05:27) (18 - 19)  SpO2: 96% (29 Dec 2021 07:06) (95% - 98%)    MEDICATIONS  (STANDING):  acetaminophen     Tablet .. 1000 milliGRAM(s) Oral every 8 hours  cefTRIAXone   IVPB 1000 milliGRAM(s) IV Intermittent every 24 hours  enoxaparin Injectable 40 milliGRAM(s) SubCutaneous every 24 hours  escitalopram 20 milliGRAM(s) Oral daily  melatonin 3 milliGRAM(s) Oral at bedtime  multivitamin 1 Tablet(s) Oral daily  multivitamin/minerals 1 Tablet(s) Oral daily  pantoprazole    Tablet 20 milliGRAM(s) Oral before breakfast  polyethylene glycol 3350 17 Gram(s) Oral daily  simvastatin 20 milliGRAM(s) Oral at bedtime    MEDICATIONS  (PRN):  OLANZapine 2.5 milliGRAM(s) Oral at bedtime PRN aggitation    Currently Undergoing Physical/ Occupational Therapy at bedside.    Functional Status Assessment:   12/28/2021      Therapeutic Interventions      Bed Mobility  Bed Mobility Training Rolling/Turning: minimum assist (75% patient effort);  1 person assist  Bed Mobility Training Scooting: minimum assist (75% patient effort);  1 person assist  Bed Mobility Training Bridging: minimum assist (75% patient effort);  1 person assist  Bed Mobility Training Sit-to-Supine: minimum assist (75% patient effort);  1 person assist  Bed Mobility Training Supine-to-Sit: minimum assist (75% patient effort);  1 person assist  Bed Mobility Training Limitations: decreased strength;  impaired balance    Sit-Stand Transfer Training  Transfer Training Sit-to-Stand Transfer: minimum assist (75% patient effort);  1 person assist;  weight-bearing as tolerated  Transfer Training Stand-to-Sit Transfer: minimum assist (75% patient effort);  1 person assist;  weight-bearing as tolerated  Sit-to-Stand Transfer Training Transfer Safety Analysis: decreased strength;  impaired balance    Gait Training  Gait Training: minimum assist (75% patient effort);  1 person assist;  weight-bearing as tolerated   HHA;  5 feet  Gait Analysis: 3-point gait   decreased strength;  impaired balance;  5 feet;  hand held assist    Therapeutic Exercise  Therapeutic Exercise Detail: Pt participated in sit to stand transfers x 3 and standing weight shifting.           PM&R Impression: as above    Current Disposition Plan:     d/c home, home P.T., 24 hr/7 day home care

## 2021-12-29 NOTE — PROGRESS NOTE ADULT - ASSESSMENT
89y F PMH dementia with episodic psychosis, frequent falls, depression/anxiety, HTN, HLD, h/o R femur 5/2021 with known COVID exposure at nursing home COVID+ without hypoxia admitted for post-fall evaluation.
per Internal Medicine    89 yo F PMH dementia with episodic psychosis, frequent falls, depression/anxiety, HTN, HLD, h/o R femur 5/2021 with known COVID exposure at nursing home COVID+ without hypoxia admitted for post-fall evaluation, found to have triquetral fracture.      Problem/Plan - 1:  ·  Problem: Sepsis.   ·  Plan: Patient with 3/4 SIRS (Fever, HR, leukocytosis) on presentation. Positive for COVID with likely superimposed bacterial pneumonia. Lactate negative. Procal 1.74. CXR grossly unremarkable. Given ceftriaxone 1 g q daily for 5 day course (12/25-12/29). BCx 12/25 NGTD.    - CTX 1g x5 days for CAP  - See covid plan below   - UA negative  - BCx 12/25 NGTD.    Problem/Plan - 2:  ·  Problem: Fall.   ·  Plan: Hx falls with prior admissions. Lives in nursing home. Unwitnessed fall. Atraumatic head on exam. TSH wnl. Can consider cardiac etiology, neuro, polypharmacy (standing benzodiazapine in elderly) in frail female, Toxic metabolic encephelopathy from Covid and CAP. PT rehab facility or [facility]. CT head cancelled as patient is mentating at baseline, no evidence of focal neuro deficits.     - Fall precautions, enhanced with sitter for dementia  - R Wrist XR with Triquetral fracture.    Problem/Plan - 3:  ·  Problem: 2019 novel coronavirus disease (COVID-19).   ·  Plan: Known exposure at nursing home. Saturating well on room air, not in respiratory distress. CXR grossly clear.    -Does not meet criteria for remdesivir or decadron  -Monitor oxygen status.    Problem/Plan - 4:  ·  Problem: Fracture of wrist.   ·  Plan: R Wrist XR with Triquetral fracture.     - short splint for immobilization  - tylenol 1000 mg q8hrs for pain.    Problem/Plan - 5:  ·  Problem: VALENTINE (acute kidney injury).   ·  Plan: Admission Cr 0.86,  0.55 at baseline. Dry on exam, S/p 2L in ed. RESOLVED.    Problem/Plan - 6:  ·  Problem: Anemia.   ·  Plan: Hb 11.4 MCV 86.7 Unclear baseline, 5/2021 14.7. Tachycardia resolved HD stable. Dry on exam, may be i/s/o poor PO intake. RESOLVED.    Problem/Plan - 7:  ·  Problem: Hypertension.   ·  Plan: Admission 160/100. Likely i/s/o of anxiety and agitation. Does not take anti HTN meds. RESOLVED.    Problem/Plan - 8:  ·  Problem: Hyperlipemia.   ·  Plan: Home med: Simvastatin 20mg  -Continue home med.    Problem/Plan - 9:  ·  Problem: Severe dementia.   ·  Plan: Collateral from NH: Alert but not oriented. Admission AAOx0. Meds at nursing home: olanzapine 2.5 at night, lexapro 20mg qd, ativan 0.5BID with 0.5 x1 PRN. Previously on haldol.   -Continue lexapro  -Continue olanzapine 2.5 at night PRN  -Holding ativan as precaution.    Problem/Plan - 10:  ·  Problem: Counseling regarding goals of care.   ·  Plan; Discussed with daughter advanced directives. Patient has document that states no extraordinary measures if there is "no hope". Discussion re: chest compressions on her frail frame and risk of fractures, pneumothorax etc however wants to have compressions but be DNI      # nutrition  Patient on pureed diet  - f/u w family to assess baseline function of nutrition, altered mental status and physical limitations.    Problem/Plan - 11:  ·  Problem: Malnutrition of mild degree.   ·  Plan: BMI 18.1, frail elderly female  -Encourage PO intake  -Nutrition consult.    Problem/Plan - 12:  ·  Problem: Prophylactic measure.   ·  Plan: Fluids: per PO  Electrolytes: replete as necessary, K>4, Mg>2  Nutrition: pureed   Bowel Regimen: Miralax  DVT ppx: Lovenox 40 qd  GI ppx: Pantoprazole 20mg home med  Code: Full  Disposition: medical floor.
89y F PMH dementia with episodic psychosis, frequent falls, depression/anxiety, HTN, HLD, h/o R femur 5/2021 with known COVID exposure at nursing home COVID+ without hypoxia admitted for post-fall evaluation.
89y F PMH dementia with episodic psychosis, frequent falls, depression/anxiety, HTN, HLD, h/o R femur 5/2021 with known COVID exposure at nursing home COVID+ without hypoxia admitted for post-fall evaluation, found to have triquetral fracture.

## 2021-12-29 NOTE — DISCHARGE NOTE NURSING/CASE MANAGEMENT/SOCIAL WORK - NSDCPEFALRISK_GEN_ALL_CORE
For information on Fall & Injury Prevention, visit: [email or web address] OR  [email or web address] OR  https://www.cdc.gov/steadi/patient.html

## 2021-12-30 ENCOUNTER — TRANSCRIPTION ENCOUNTER (OUTPATIENT)
Age: 86
End: 2021-12-30

## 2021-12-30 LAB
CULTURE RESULTS: SIGNIFICANT CHANGE UP
CULTURE RESULTS: SIGNIFICANT CHANGE UP
SPECIMEN SOURCE: SIGNIFICANT CHANGE UP
SPECIMEN SOURCE: SIGNIFICANT CHANGE UP

## 2022-01-10 DIAGNOSIS — E78.5 HYPERLIPIDEMIA, UNSPECIFIED: ICD-10-CM

## 2022-01-10 DIAGNOSIS — S62.101A FRACTURE OF UNSPECIFIED CARPAL BONE, RIGHT WRIST, INITIAL ENCOUNTER FOR CLOSED FRACTURE: ICD-10-CM
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
## 2022-01-10 DIAGNOSIS — U07.1 COVID-19: ICD-10-CM

## 2022-01-10 DIAGNOSIS — A41.89 OTHER SPECIFIED SEPSIS: ICD-10-CM

## 2022-01-10 DIAGNOSIS — E44.1 MILD PROTEIN-CALORIE MALNUTRITION: ICD-10-CM

## 2022-01-10 DIAGNOSIS — N17.9 ACUTE KIDNEY FAILURE, UNSPECIFIED: ICD-10-CM

## 2022-01-10 DIAGNOSIS — I10 ESSENTIAL (PRIMARY) HYPERTENSION: ICD-10-CM

## 2022-01-10 DIAGNOSIS — G30.9 ALZHEIMER'S DISEASE, UNSPECIFIED: ICD-10-CM

## 2022-01-10 DIAGNOSIS — F02.80 DEMENTIA IN OTHER DISEASES CLASSIFIED ELSEWHERE, UNSPECIFIED SEVERITY, WITHOUT BEHAVIORAL DISTURBANCE, PSYCHOTIC DISTURBANCE, MOOD DISTURBANCE, AND ANXIETY: ICD-10-CM

## 2022-01-10 DIAGNOSIS — Z91.81 HISTORY OF FALLING: ICD-10-CM
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
## 2022-01-10 DIAGNOSIS — R53.1 WEAKNESS: ICD-10-CM

## 2022-01-10 DIAGNOSIS — G92.8 OTHER TOXIC ENCEPHALOPATHY: ICD-10-CM

## 2022-01-10 DIAGNOSIS — J12.82 PNEUMONIA DUE TO CORONAVIRUS DISEASE 2019: ICD-10-CM

## 2023-08-09 NOTE — ED ADULT TRIAGE NOTE - HEIGHT IN FEET
Patient remained quiet during night shift . No unusual behavioral concerns noted. Patient observed resting quietly in bed  during shift. Continuous monitoring for care and safety maintained Patient slept for 8. 5

## 2024-04-25 NOTE — PATIENT PROFILE ADULT - DEAF OR HARD OF HEARING?
Meralgia paresthetica, try home exercise    1. Annual physical exam  -     Comprehensive metabolic panel; Future  -     Lipid panel; Future  -     Hemoglobin A1C; Future    2. Encounter for immunization    3. Need for hepatitis C screening test  -     Hepatitis C Antibody; Future    4. Screening for HIV (human immunodeficiency virus)  -     HIV 1/2 AG/AB w Reflex [facility] for 2 yr old and above; Future    5. Lateral epicondylitis of right elbow    6. Primary hypertension    7. Hyperlipidemia, unspecified hyperlipidemia type    8. Glaucoma, unspecified glaucoma type, unspecified laterality  -     Ambulatory Referral to Ophthalmology; Future        no